# Patient Record
Sex: FEMALE | Race: WHITE | Employment: FULL TIME | URBAN - METROPOLITAN AREA
[De-identification: names, ages, dates, MRNs, and addresses within clinical notes are randomized per-mention and may not be internally consistent; named-entity substitution may affect disease eponyms.]

---

## 2017-02-10 ENCOUNTER — OFFICE VISIT (OUTPATIENT)
Dept: FAMILY MEDICINE CLINIC | Facility: CLINIC | Age: 52
End: 2017-02-10

## 2017-02-10 VITALS
RESPIRATION RATE: 16 BRPM | TEMPERATURE: 98 F | HEIGHT: 67 IN | HEART RATE: 72 BPM | BODY MASS INDEX: 27.25 KG/M2 | SYSTOLIC BLOOD PRESSURE: 110 MMHG | DIASTOLIC BLOOD PRESSURE: 70 MMHG | WEIGHT: 173.63 LBS

## 2017-02-10 DIAGNOSIS — E55.9 VITAMIN D DEFICIENCY: Primary | ICD-10-CM

## 2017-02-10 DIAGNOSIS — G43.009 MIGRAINE WITHOUT AURA AND WITHOUT STATUS MIGRAINOSUS, NOT INTRACTABLE: ICD-10-CM

## 2017-02-10 DIAGNOSIS — F41.9 ANXIETY: ICD-10-CM

## 2017-02-10 DIAGNOSIS — E03.1 CONGENITAL HYPOTHYROIDISM WITHOUT GOITER: ICD-10-CM

## 2017-02-10 DIAGNOSIS — Z01.89 ROUTINE LAB DRAW: ICD-10-CM

## 2017-02-10 PROCEDURE — 99214 OFFICE O/P EST MOD 30 MIN: CPT | Performed by: INTERNAL MEDICINE

## 2017-02-10 RX ORDER — LEVOTHYROXINE SODIUM 0.12 MG/1
125 TABLET ORAL
Qty: 90 TABLET | Refills: 1 | Status: SHIPPED | OUTPATIENT
Start: 2017-02-10 | End: 2017-06-07

## 2017-02-10 NOTE — PROGRESS NOTES
CC: Patient presents with:  Medication Follow-Up       HPI:     [facility] [place] [facility] that her [facility] are feeling good overall. Feels that it is very quiet, if she pushes herself that she gets sick.      Headaches have been worse , without aura and without status migrainosus, not intractable     Carcinoma in situ of breast        REVIEW OF SYSTEMS:   GENERAL HEALTH: feels well otherwise, denied any fevers chills or night sweats   RESPIRATORY: denies shortness of breath   CARDIOVASCUL migraine headache    Anxiety  -continue xanax for prn anxiety        The patient indicates understanding of these issues and agrees to the plan. Return in about 6 months (around 8/10/2017).

## 2017-02-13 ENCOUNTER — OFFICE VISIT (OUTPATIENT)
Dept: OBGYN CLINIC | Facility: CLINIC | Age: 52
End: 2017-02-13

## 2017-02-13 VITALS
SYSTOLIC BLOOD PRESSURE: 108 MMHG | BODY MASS INDEX: 27.48 KG/M2 | HEIGHT: 66 IN | DIASTOLIC BLOOD PRESSURE: 68 MMHG | WEIGHT: 171 LBS | HEART RATE: 82 BPM

## 2017-02-13 DIAGNOSIS — Z79.810 CARE RELATED TO CURRENT TAMOXIFEN USE: ICD-10-CM

## 2017-02-13 DIAGNOSIS — N94.19 DYSPAREUNIA DUE TO MEDICAL CONDITION IN FEMALE: ICD-10-CM

## 2017-02-13 DIAGNOSIS — N92.6 IRREGULAR MENSES: ICD-10-CM

## 2017-02-13 DIAGNOSIS — Z01.419 WELL FEMALE EXAM WITH ROUTINE GYNECOLOGICAL EXAM: Primary | ICD-10-CM

## 2017-02-13 DIAGNOSIS — Z12.4 CERVICAL CANCER SCREENING: ICD-10-CM

## 2017-02-13 DIAGNOSIS — D05.11 DUCTAL CARCINOMA IN SITU (DCIS) OF RIGHT BREAST: ICD-10-CM

## 2017-02-13 PROCEDURE — 87624 HPV HI-RISK TYP POOLED RSLT: CPT | Performed by: OBSTETRICS & GYNECOLOGY

## 2017-02-13 PROCEDURE — 99396 PREV VISIT EST AGE 40-64: CPT | Performed by: OBSTETRICS & GYNECOLOGY

## 2017-02-13 PROCEDURE — 88175 CYTOPATH C/V AUTO FLUID REDO: CPT | Performed by: OBSTETRICS & GYNECOLOGY

## 2017-02-14 LAB — HPV I/H RISK 1 DNA SPEC QL NAA+PROBE: NEGATIVE

## 2017-02-14 NOTE — PROGRESS NOTES
GYN H&P     2017  6:35 PM    CC: Patient is here for annual    HPI: patient is a 54year old  here for her annual gyn exam.   She has complaints of irreg menses off and on and pain with orgasms. Either with or without intercourse.  Is taking barber Units by mouth daily. Disp:  Rfl:    Multiple Vitamin (MULTI-VITAMIN) Oral Tab Take 1 Tab by mouth daily. Disp:  Rfl:    Ibuprofen (ADVIL) 200 MG Oral Tab Take 400 mg by mouth 2 (two) times daily.  Disp:  Rfl:      No current facility-administered medicatio allergies, denies anaphylaxis, or swollen lymph nodes  Musculoskeletal: denies joint pain, morning stiffness, decreased range of motion         O /68 mmHg  Pulse 82  Ht 66\"  Wt 171 lb  BMI 27.61 kg/m2  LMP 02/05/2017  Exam:   GENERAL: well developed Prep Collect    3. Ductal carcinoma in situ (DCIS) of right breast    - US PELVIS EV; Future    4. Dyspareunia due to medical condition in female    - US PELVIS EV; Future    5. Irregular menses    - US PELVIS EV; Future    6.  Care related to current tamox

## 2017-02-25 ENCOUNTER — LAB ENCOUNTER (OUTPATIENT)
Dept: LAB | Age: 52
End: 2017-02-25
Attending: INTERNAL MEDICINE
Payer: COMMERCIAL

## 2017-02-25 ENCOUNTER — TELEPHONE (OUTPATIENT)
Dept: FAMILY MEDICINE CLINIC | Facility: CLINIC | Age: 52
End: 2017-02-25

## 2017-02-25 DIAGNOSIS — E55.9 VITAMIN D DEFICIENCY: ICD-10-CM

## 2017-02-25 DIAGNOSIS — E03.1 CONGENITAL HYPOTHYROIDISM WITHOUT GOITER: ICD-10-CM

## 2017-02-25 DIAGNOSIS — Z01.89 ROUTINE LAB DRAW: ICD-10-CM

## 2017-02-25 LAB
25-HYDROXYVITAMIN D (TOTAL): 27.8 NG/ML (ref 30–100)
ALBUMIN SERPL-MCNC: 3.7 G/DL (ref 3.5–4.8)
ALP LIVER SERPL-CCNC: 32 U/L (ref 39–100)
ALT SERPL-CCNC: 16 U/L (ref 14–54)
AST SERPL-CCNC: 12 U/L (ref 15–41)
BASOPHILS # BLD AUTO: 0.02 X10(3) UL (ref 0–0.1)
BASOPHILS NFR BLD AUTO: 0.4 %
BILIRUB SERPL-MCNC: 0.5 MG/DL (ref 0.1–2)
BUN BLD-MCNC: 11 MG/DL (ref 8–20)
CALCIUM BLD-MCNC: 8.9 MG/DL (ref 8.3–10.3)
CHLORIDE: 109 MMOL/L (ref 101–111)
CHOLEST SMN-MCNC: 172 MG/DL (ref ?–200)
CO2: 24 MMOL/L (ref 22–32)
CREAT BLD-MCNC: 0.9 MG/DL (ref 0.55–1.02)
EOSINOPHIL # BLD AUTO: 0.13 X10(3) UL (ref 0–0.3)
EOSINOPHIL NFR BLD AUTO: 2.4 %
ERYTHROCYTE [DISTWIDTH] IN BLOOD BY AUTOMATED COUNT: 12.3 % (ref 11.5–16)
GLUCOSE BLD-MCNC: 83 MG/DL (ref 70–99)
HCT VFR BLD AUTO: 39.4 % (ref 34–50)
HDLC SERPL-MCNC: 71 MG/DL (ref 45–?)
HDLC SERPL: 2.42 {RATIO} (ref ?–4.44)
HGB BLD-MCNC: 13.3 G/DL (ref 12–16)
IMMATURE GRANULOCYTE COUNT: 0.01 X10(3) UL (ref 0–1)
IMMATURE GRANULOCYTE RATIO %: 0.2 %
LDLC SERPL CALC-MCNC: 81 MG/DL (ref ?–130)
LYMPHOCYTES # BLD AUTO: 1.56 X10(3) UL (ref 0.9–4)
LYMPHOCYTES NFR BLD AUTO: 28.8 %
M PROTEIN MFR SERPL ELPH: 7 G/DL (ref 6.1–8.3)
MCH RBC QN AUTO: 30.9 PG (ref 27–33.2)
MCHC RBC AUTO-ENTMCNC: 33.8 G/DL (ref 31–37)
MCV RBC AUTO: 91.4 FL (ref 81–100)
MONOCYTES # BLD AUTO: 0.67 X10(3) UL (ref 0.1–0.6)
MONOCYTES NFR BLD AUTO: 12.4 %
NEUTROPHIL ABS PRELIM: 3.02 X10 (3) UL (ref 1.3–6.7)
NEUTROPHILS # BLD AUTO: 3.02 X10(3) UL (ref 1.3–6.7)
NEUTROPHILS NFR BLD AUTO: 55.8 %
NONHDLC SERPL-MCNC: 101 MG/DL (ref ?–130)
PLATELET # BLD AUTO: 222 10(3)UL (ref 150–450)
POTASSIUM SERPL-SCNC: 4.3 MMOL/L (ref 3.6–5.1)
RBC # BLD AUTO: 4.31 X10(6)UL (ref 3.8–5.1)
RED CELL DISTRIBUTION WIDTH-SD: 41 FL (ref 35.1–46.3)
SODIUM SERPL-SCNC: 141 MMOL/L (ref 136–144)
TRIGLYCERIDES: 99 MG/DL (ref ?–150)
TSI SER-ACNC: 1.56 MIU/ML (ref 0.35–5.5)
VLDL: 20 MG/DL (ref 5–40)
WBC # BLD AUTO: 5.4 X10(3) UL (ref 4–13)

## 2017-02-25 PROCEDURE — 82306 VITAMIN D 25 HYDROXY: CPT

## 2017-02-25 PROCEDURE — 80053 COMPREHEN METABOLIC PANEL: CPT

## 2017-02-25 PROCEDURE — 36415 COLL VENOUS BLD VENIPUNCTURE: CPT

## 2017-02-25 PROCEDURE — 84443 ASSAY THYROID STIM HORMONE: CPT

## 2017-02-25 PROCEDURE — 80061 LIPID PANEL: CPT

## 2017-02-25 PROCEDURE — 85025 COMPLETE CBC W/AUTO DIFF WBC: CPT

## 2017-02-25 NOTE — TELEPHONE ENCOUNTER
[address] [facility], [place] [address] Fax [phone]  / Pt signed Authorization to [facility] information also attached.

## 2017-03-01 ENCOUNTER — MED REC SCAN ONLY (OUTPATIENT)
Dept: FAMILY MEDICINE CLINIC | Facility: CLINIC | Age: 52
End: 2017-03-01

## 2017-03-02 RX ORDER — ERGOCALCIFEROL 1.25 MG/1
50000 CAPSULE ORAL WEEKLY
Qty: 4 CAPSULE | Refills: 3 | Status: SHIPPED | OUTPATIENT
Start: 2017-03-02 | End: 2017-04-01

## 2017-03-06 NOTE — TELEPHONE ENCOUNTER
Form signed by MD.    It is missing patient signature, cannot be faxed without it. LM informing patient that she will need to come into the office to sign. Fax back to [employer] at [phone]. Forms in red triage folder.

## 2017-03-07 NOTE — TELEPHONE ENCOUNTER
Patient states that she will come into the office on Saturday 3/11/17 to sign form. Post it note placed on paperwork stating \" after patient signs, please return to triage nurses. \"  We will need to fax form.

## 2017-03-12 ENCOUNTER — OFFICE VISIT (OUTPATIENT)
Dept: FAMILY MEDICINE CLINIC | Facility: CLINIC | Age: 52
End: 2017-03-12

## 2017-03-12 VITALS
TEMPERATURE: 99 F | SYSTOLIC BLOOD PRESSURE: 110 MMHG | RESPIRATION RATE: 12 BRPM | WEIGHT: 171 LBS | HEART RATE: 72 BPM | HEIGHT: 67 IN | BODY MASS INDEX: 26.84 KG/M2 | DIASTOLIC BLOOD PRESSURE: 74 MMHG

## 2017-03-12 DIAGNOSIS — J01.10 ACUTE FRONTAL SINUSITIS, RECURRENCE NOT SPECIFIED: ICD-10-CM

## 2017-03-12 DIAGNOSIS — J01.00 ACUTE MAXILLARY SINUSITIS, RECURRENCE NOT SPECIFIED: Primary | ICD-10-CM

## 2017-03-12 DIAGNOSIS — R09.82 POST-NASAL DRIP: ICD-10-CM

## 2017-03-12 PROCEDURE — 99213 OFFICE O/P EST LOW 20 MIN: CPT | Performed by: NURSE PRACTITIONER

## 2017-03-12 RX ORDER — AMOXICILLIN AND CLAVULANATE POTASSIUM 875; 125 MG/1; MG/1
1 TABLET, FILM COATED ORAL 2 TIMES DAILY
Qty: 20 TABLET | Refills: 0 | Status: SHIPPED | OUTPATIENT
Start: 2017-03-12 | End: 2017-03-22

## 2017-03-12 RX ORDER — FLUTICASONE PROPIONATE 50 MCG
1 SPRAY, SUSPENSION (ML) NASAL 2 TIMES DAILY
Qty: 1 BOTTLE | Refills: 1 | Status: SHIPPED | OUTPATIENT
Start: 2017-03-12 | End: 2017-04-11

## 2017-03-12 NOTE — PATIENT INSTRUCTIONS
·  PLAN: Augmentin, take as directed. Finish all the medication even if you feel better. · Probiotics or yogurt daily at lunch time during antibiotic use will help decrease stomach upset and restore good bacteria to the gut.   Please start Flonase 1 spray

## 2017-03-12 NOTE — PROGRESS NOTES
HPI:   Tito Alanis is a 54year old female who presents with ill symptoms for  1  months.  Patient reports intermittent headaches, congestion, thick green colored nasal discharge, ear pain, sinus pain, OTC cold meds have not been helping, prior histo Family History   Problem Relation Age of Onset   • Hypertension Father    • Lipids Mother    • Cancer Mother 76     Lung, smoker        Smoking Status: Never Smoker                      Alcohol Use: Yes           0.0 oz/week       0 Standard drinks or eq decongestants, throat lozenges and Tylenol . Self care discussed. Medication use and risk/benefit discussed. Patient Instructions   ·  PLAN: Augmentin, take as directed. Finish all the medication even if you feel better.    · Probiotics or yogurt daily at

## 2017-04-17 PROCEDURE — 84165 PROTEIN E-PHORESIS SERUM: CPT | Performed by: INTERNAL MEDICINE

## 2017-04-17 PROCEDURE — 86235 NUCLEAR ANTIGEN ANTIBODY: CPT | Performed by: INTERNAL MEDICINE

## 2017-04-17 PROCEDURE — 83883 ASSAY NEPHELOMETRY NOT SPEC: CPT | Performed by: INTERNAL MEDICINE

## 2017-04-17 PROCEDURE — 81003 URINALYSIS AUTO W/O SCOPE: CPT | Performed by: INTERNAL MEDICINE

## 2017-04-17 PROCEDURE — 86334 IMMUNOFIX E-PHORESIS SERUM: CPT | Performed by: INTERNAL MEDICINE

## 2017-05-06 ENCOUNTER — PATIENT MESSAGE (OUTPATIENT)
Dept: FAMILY MEDICINE CLINIC | Facility: CLINIC | Age: 52
End: 2017-05-06

## 2017-05-08 RX ORDER — RIZATRIPTAN BENZOATE 10 MG/1
10 TABLET ORAL AS NEEDED
Qty: 10 TABLET | Refills: 5 | Status: CANCELLED | OUTPATIENT
Start: 2017-05-08

## 2017-05-08 NOTE — TELEPHONE ENCOUNTER
From: Nina Justin  To: Carla Tavarez MD  Sent: 5/6/2017 5:19 PM CDT  Subject: Medication Renewal Request    Original authorizing provider: MD Nina Bagley would like a refill of the following medications:  alprazolam 0.25 MG Or

## 2017-05-08 NOTE — TELEPHONE ENCOUNTER
From: Sherri Osman  To: Desiree Zavala MD  Sent: 5/6/2017 5:24 PM CDT  Subject: Prescription Question    Hi Dr. Andre Holman,   I have 5 refills on my rizatriptan still. I requested a refill through your office before I realized this. Don't need anymore. Sorry!

## 2017-05-09 NOTE — TELEPHONE ENCOUNTER
Requesting Alprazolam   LOV: 2/10/17 Discussed at this visit.    RTC: 6m   Last Labs: n/a   Filled: 10/19/16 #30 with 0 refills    Future Appointments  Date Time Provider Farzana Butler   7/26/2017 9:40 AM Otoniel Motta, DO [facility] N[facility]     Per I

## 2017-05-12 RX ORDER — ALPRAZOLAM 0.25 MG/1
0.25 TABLET ORAL DAILY PRN
Qty: 30 TABLET | Refills: 0 | Status: SHIPPED
Start: 2017-05-12 | End: 2018-04-16

## 2017-06-07 RX ORDER — LEVOTHYROXINE SODIUM 0.12 MG/1
125 TABLET ORAL
Qty: 90 TABLET | Refills: 1 | Status: SHIPPED | OUTPATIENT
Start: 2017-06-07 | End: 2017-11-14

## 2017-06-07 NOTE — TELEPHONE ENCOUNTER
Requesting levothyroxine  LOV: 2/10/17  RTC: 6 months  Last Labs: 2/25/17  Filled: 2/10/17 #90 with 1 refills    Future Appointments  Date Time Provider Farzana Butler   7/26/2017 9:40 AM Marilu Berry DO [facility] N[facility]     Passes protocol - refi

## 2017-07-26 PROCEDURE — 86160 COMPLEMENT ANTIGEN: CPT | Performed by: INTERNAL MEDICINE

## 2017-08-29 ENCOUNTER — TELEPHONE (OUTPATIENT)
Dept: FAMILY MEDICINE CLINIC | Facility: CLINIC | Age: 52
End: 2017-08-29

## 2017-08-29 NOTE — PROGRESS NOTES
CC: Patient presents with:  Medication Follow-Up       HPI:     Under a lot of stress  Feels more joint pain with the stress   Plaquenil does help with symptoms  Does walk regularly and breathing exercises or journaling   Feels that she is gaining weig SYSTEMS:   GENERAL HEALTH: feels well otherwise, denied any fevers chills or night sweats   RESPIRATORY: denies shortness of breath   CARDIOVASCULAR: denies chest pain  GI: denies abdominal pain    EXAM:   /72   Pulse 78   Resp 16   Ht 67\"   Wt 177

## 2017-08-29 NOTE — TELEPHONE ENCOUNTER
Dr. Kelley Castillo saw patient in office today and would like to start patient on Wellbutrin for anxiety but there is a slight drug interaction noted between Wellbutrin and Tamoxifen.  Dr. Kelley Castillo would like RN to contact patients oncologist Dr. Papi Meyer to inquire if

## 2017-08-31 RX ORDER — ESCITALOPRAM OXALATE 10 MG/1
10 TABLET ORAL DAILY
Qty: 30 TABLET | Refills: 1 | Status: SHIPPED | OUTPATIENT
Start: 2017-08-31 | End: 2017-10-12

## 2017-08-31 NOTE — TELEPHONE ENCOUNTER
Time started: 1108    Time ended: 1116    Total time spent on chart: 8 min    Patient needs a later evening appointment or Saturday. Could only find below listed appointment. Will send enough lexapro to get to appointment. Order sent.     Future Appointm

## 2017-08-31 NOTE — TELEPHONE ENCOUNTER
Called Dr. Meza Math office and left another message with PSR that we need to know if it is ok with Dr. Usman Singh that patient start on Wellbutrin for anxiety. We should receive a call back today with response.

## 2017-10-12 ENCOUNTER — TELEPHONE (OUTPATIENT)
Dept: FAMILY MEDICINE CLINIC | Facility: CLINIC | Age: 52
End: 2017-10-12

## 2017-10-12 NOTE — TELEPHONE ENCOUNTER
LMOM for patient to call back and discuss.      Time started: 1510    Time ended: 1512    Total time spent on chart: 2 min

## 2017-10-12 NOTE — TELEPHONE ENCOUNTER
- patient called and stated that she stopped taking her anti depressent because it made her very tired. She wanted to let Victoria Seats know. Please advise.

## 2017-10-12 NOTE — TELEPHONE ENCOUNTER
LOV: 8/29/17 with Dr. Andre Holman:  PLAN:  Anxiety  (primary encounter diagnosis)  -start wellbutrin xl after clearance with her medical oncologist   -refer to [facility]  The patient indicates understanding of these issues and agrees to the plan.   Return in about 6 mo

## 2017-10-13 NOTE — TELEPHONE ENCOUNTER
Informed patient to cut her lexapro 10mg tabs in half and just take half a tab (5mg) at night and see if that is better. Patient will comply.

## 2017-11-14 RX ORDER — LEVOTHYROXINE SODIUM 0.12 MG/1
TABLET ORAL
Qty: 90 TABLET | Refills: 0 | Status: SHIPPED | OUTPATIENT
Start: 2017-11-14 | End: 2018-02-11

## 2017-11-14 NOTE — TELEPHONE ENCOUNTER
Requesting: Levothyroxine 125mg  LOV: 8/29/17  RTC: 6 months  Last Relevant Labs: 2/25/17 - TSH  Filled: 6/7/17 #90 with 1 refills    Future Appointments  Date Time Provider Farzana Butler   12/18/2017 10:40 AM Lanie Chi DO [facility]

## 2017-12-18 PROCEDURE — 86160 COMPLEMENT ANTIGEN: CPT | Performed by: INTERNAL MEDICINE

## 2017-12-18 PROCEDURE — 81003 URINALYSIS AUTO W/O SCOPE: CPT | Performed by: INTERNAL MEDICINE

## 2018-02-12 RX ORDER — LEVOTHYROXINE SODIUM 0.12 MG/1
TABLET ORAL
Qty: 90 TABLET | Refills: 1 | Status: SHIPPED | OUTPATIENT
Start: 2018-02-12 | End: 2018-08-08

## 2018-02-12 NOTE — TELEPHONE ENCOUNTER
Requesting levothyroxine  LOV: 8/29/17  RTC: 6 months  Last Relevant Labs: 2/25/17  Filled: 11/14/17 #90 with 0 refills    Future Appointments  Date Time Provider Farzana Butler   5/21/2018 10:00 AM Chikis Filter, DO [facility] NPV       Refilled

## 2018-04-11 RX ORDER — RIZATRIPTAN BENZOATE 10 MG/1
10 TABLET ORAL AS NEEDED
Qty: 10 TABLET | Refills: 5
Start: 2018-04-11

## 2018-04-11 NOTE — TELEPHONE ENCOUNTER
From: Gume Ye  Sent: 4/7/2018 7:20 AM CDT  Subject: Medication Renewal Request    Silvano Hanna.  Mariana Burt would like a refill of the following medications:     Rizatriptan Benzoate (MAXALT) 10 MG Oral Tab Allie Gonzalez MD]    Preferred pharmacy: [facility]

## 2018-04-16 ENCOUNTER — OFFICE VISIT (OUTPATIENT)
Dept: FAMILY MEDICINE CLINIC | Facility: CLINIC | Age: 53
End: 2018-04-16

## 2018-04-16 ENCOUNTER — TELEPHONE (OUTPATIENT)
Dept: FAMILY MEDICINE CLINIC | Facility: CLINIC | Age: 53
End: 2018-04-16

## 2018-04-16 ENCOUNTER — APPOINTMENT (OUTPATIENT)
Dept: LAB | Age: 53
End: 2018-04-16
Attending: FAMILY MEDICINE
Payer: COMMERCIAL

## 2018-04-16 VITALS
HEIGHT: 67 IN | BODY MASS INDEX: 28.56 KG/M2 | RESPIRATION RATE: 16 BRPM | SYSTOLIC BLOOD PRESSURE: 110 MMHG | TEMPERATURE: 98 F | DIASTOLIC BLOOD PRESSURE: 70 MMHG | WEIGHT: 182 LBS | HEART RATE: 64 BPM

## 2018-04-16 DIAGNOSIS — R92.2 DENSE BREAST TISSUE ON MAMMOGRAM: ICD-10-CM

## 2018-04-16 DIAGNOSIS — Z00.00 ROUTINE MEDICAL EXAM: Primary | ICD-10-CM

## 2018-04-16 DIAGNOSIS — G43.009 MIGRAINE WITHOUT AURA AND WITHOUT STATUS MIGRAINOSUS, NOT INTRACTABLE: ICD-10-CM

## 2018-04-16 DIAGNOSIS — E55.9 VITAMIN D DEFICIENCY: ICD-10-CM

## 2018-04-16 DIAGNOSIS — Z00.00 LABORATORY EXAM ORDERED AS PART OF ROUTINE GENERAL MEDICAL EXAMINATION: ICD-10-CM

## 2018-04-16 DIAGNOSIS — Z12.11 COLON CANCER SCREENING: ICD-10-CM

## 2018-04-16 DIAGNOSIS — F41.9 ANXIETY: ICD-10-CM

## 2018-04-16 DIAGNOSIS — Z12.39 BREAST CANCER SCREENING: ICD-10-CM

## 2018-04-16 PROBLEM — R92.30 DENSE BREAST TISSUE ON MAMMOGRAM: Status: ACTIVE | Noted: 2018-04-16

## 2018-04-16 PROCEDURE — 36415 COLL VENOUS BLD VENIPUNCTURE: CPT | Performed by: FAMILY MEDICINE

## 2018-04-16 PROCEDURE — 80053 COMPREHEN METABOLIC PANEL: CPT | Performed by: FAMILY MEDICINE

## 2018-04-16 PROCEDURE — 99213 OFFICE O/P EST LOW 20 MIN: CPT | Performed by: FAMILY MEDICINE

## 2018-04-16 PROCEDURE — 82306 VITAMIN D 25 HYDROXY: CPT | Performed by: FAMILY MEDICINE

## 2018-04-16 PROCEDURE — 99396 PREV VISIT EST AGE 40-64: CPT | Performed by: FAMILY MEDICINE

## 2018-04-16 PROCEDURE — 80061 LIPID PANEL: CPT | Performed by: FAMILY MEDICINE

## 2018-04-16 RX ORDER — BUSPIRONE HYDROCHLORIDE 7.5 MG/1
7.5 TABLET ORAL 2 TIMES DAILY
Qty: 180 TABLET | Refills: 1 | Status: SHIPPED | OUTPATIENT
Start: 2018-04-16 | End: 2019-01-13

## 2018-04-16 RX ORDER — ALPRAZOLAM 0.25 MG/1
0.25 TABLET ORAL DAILY PRN
Qty: 30 TABLET | Refills: 0 | Status: SHIPPED | OUTPATIENT
Start: 2018-04-16 | End: 2018-08-08

## 2018-04-16 RX ORDER — RIZATRIPTAN BENZOATE 10 MG/1
10 TABLET ORAL AS NEEDED
Qty: 10 TABLET | Refills: 2 | Status: SHIPPED | OUTPATIENT
Start: 2018-04-16 | End: 2018-08-08

## 2018-04-16 NOTE — TELEPHONE ENCOUNTER
Patient brought in [address] screening Form to be completed at today's office visit. Lab work is required in order for completion of form. Labs drawn today & are pending. Form placed in Triage paperwork pending file.   Once completed please fax t

## 2018-04-16 NOTE — PROGRESS NOTES
Patient presents with:  Physical: Biometric Screening Form to be completed. Fasting for lab work today. Med refills requested.   Pap: States Mammogram is sched for 7/2018 @ [address].      HPI:  Melva Park is a 48year old female here to Oral Tab TAKE 1 TABLET(200 MG) BY MOUTH TWICE DAILY Disp: 180 tablet Rfl: 0   LEVOTHYROXINE SODIUM 125 MCG Oral Tab TAKE 1 TABLET BY MOUTH EVERY DAY Disp: 90 tablet Rfl: 1   Tamoxifen Citrate (NOLVADEX) 20 MG Oral Tab Take 20 mg by mouth daily.    Disp:  Rf cervical lymphadenopathy. No thyromegaly or masses. PULMONARY:  Lungs clear to auscultation bilaterally. No wheezes, rales, or rhonchi. Normal respiratory effort. CARDIOVASCULAR:  Regular rate and rhythm. No murmurs, gallops, or rubs.   ABDOMEN:  + bowel

## 2018-04-16 NOTE — PROGRESS NOTES
Received via fax Left Breast Mammogram report/results from [facility]   In [place], [facility]. Date of exam was 07/10/2017. Report placed in Dr. Abdullahi Valladares in box for review.

## 2018-05-21 PROCEDURE — 86160 COMPLEMENT ANTIGEN: CPT | Performed by: INTERNAL MEDICINE

## 2018-05-21 PROCEDURE — 86225 DNA ANTIBODY NATIVE: CPT | Performed by: INTERNAL MEDICINE

## 2018-05-21 PROCEDURE — 81003 URINALYSIS AUTO W/O SCOPE: CPT | Performed by: INTERNAL MEDICINE

## 2018-05-30 ENCOUNTER — OFFICE VISIT (OUTPATIENT)
Dept: FAMILY MEDICINE CLINIC | Facility: CLINIC | Age: 53
End: 2018-05-30

## 2018-05-30 VITALS
WEIGHT: 183 LBS | HEART RATE: 75 BPM | TEMPERATURE: 99 F | BODY MASS INDEX: 28.72 KG/M2 | SYSTOLIC BLOOD PRESSURE: 110 MMHG | HEIGHT: 67 IN | DIASTOLIC BLOOD PRESSURE: 60 MMHG | OXYGEN SATURATION: 97 % | RESPIRATION RATE: 16 BRPM

## 2018-05-30 DIAGNOSIS — J20.9 ACUTE BRONCHITIS, UNSPECIFIED ORGANISM: Primary | ICD-10-CM

## 2018-05-30 PROCEDURE — 99213 OFFICE O/P EST LOW 20 MIN: CPT | Performed by: NURSE PRACTITIONER

## 2018-05-30 RX ORDER — BENZONATATE 200 MG/1
200 CAPSULE ORAL 3 TIMES DAILY PRN
Qty: 21 CAPSULE | Refills: 0 | Status: SHIPPED | OUTPATIENT
Start: 2018-05-30 | End: 2018-06-06

## 2018-05-30 RX ORDER — FLUTICASONE PROPIONATE 50 MCG
2 SPRAY, SUSPENSION (ML) NASAL DAILY
Qty: 1 BOTTLE | Refills: 0 | Status: SHIPPED | OUTPATIENT
Start: 2018-05-30 | End: 2020-02-17

## 2018-05-30 NOTE — PROGRESS NOTES
CHIEF COMPLAINT:   Patient presents with:  Cough: x8 days, worst in the morning        HPI:   Tru Reyes is a 48year old female who presents for cough for  1  weeks.   Cough started gradually and is described as dry reports occasionally coughs up y GENERAL: no fever  SKIN: No rashes, or other skin lesions. EYES: Denies blurred vision or double vision. HENT: Denies ear pain, decreased hearing, or sore throat. Reports mild sinus congestion.   CARDIOVASCULAR: Denies chest pain or palpitations  LUNGS: Your healthcare provider has told you that you have acute bronchitis. Bronchitis is infection or inflammation of the bronchial tubes (airways in the lungs). Normally, air moves easily in and out of the airways.  Bronchitis narrows the airways, making it evelyne · Drink plenty of fluids, such as water, juice, or warm soup. Fluids loosen mucus so that you can cough it up. This helps you breathe more easily. Fluids also prevent dehydration. · Make sure you get plenty of rest.  · Do not smoke.  Do not allow anyone el

## 2018-06-10 ENCOUNTER — OFFICE VISIT (OUTPATIENT)
Dept: FAMILY MEDICINE CLINIC | Facility: CLINIC | Age: 53
End: 2018-06-10

## 2018-06-10 VITALS
SYSTOLIC BLOOD PRESSURE: 114 MMHG | OXYGEN SATURATION: 98 % | DIASTOLIC BLOOD PRESSURE: 60 MMHG | HEART RATE: 87 BPM | WEIGHT: 183 LBS | RESPIRATION RATE: 16 BRPM | BODY MASS INDEX: 28.72 KG/M2 | TEMPERATURE: 98 F | HEIGHT: 67 IN

## 2018-06-10 DIAGNOSIS — J01.40 ACUTE NON-RECURRENT PANSINUSITIS: Primary | ICD-10-CM

## 2018-06-10 PROCEDURE — 99213 OFFICE O/P EST LOW 20 MIN: CPT | Performed by: NURSE PRACTITIONER

## 2018-06-10 RX ORDER — DOXYCYCLINE 100 MG/1
100 CAPSULE ORAL 2 TIMES DAILY
Qty: 14 CAPSULE | Refills: 0 | Status: SHIPPED | OUTPATIENT
Start: 2018-06-10 | End: 2018-06-17

## 2018-06-10 NOTE — PATIENT INSTRUCTIONS
Self-Care for Sinusitis     Drinking plenty of water can help sinuses drain. Sinusitis can often be managed with self-care. Self-care can keep sinuses moist and make you feel more comfortable. Remember to follow your doctor's instructions closely.  This Sinusitis (Antibiotic Treatment)    The sinuses are air-filled spaces within the bones of the face. They connect to the inside of the nose. Sinusitis is an inflammation of the tissue lining the sinus cavity. Sinus inflammation can occur during a cold.  It c · Do not use nasal rinses or irrigation during an acute sinus infection, unless told to by your health care provider. Rinsing may spread the infection to other sinuses.   · Use acetaminophen or ibuprofen to control pain, unless another pain medicine was pre

## 2018-06-10 NOTE — PROGRESS NOTES
CHIEF COMPLAINT:   Patient presents with:  Cough: f/u 5/30/18, no improvement   Nasal Congestion: sinus pressure      HPI:   Margarita Richards is a 48year old female who presents for cold symptoms for  18   days.  Symptoms have progressed into sinus conge R breast mastectomy, chemo   • Dense breast tissue on mammogram    • Dysmenorrhea    • IBS (irritable bowel syndrome)    • Migraines    • Sjogren's syndrome [facility])     sees Dr. Leopoldo Brush   • Vitamin D insufficiency 2014      Past Surgical History:  No date: AP ASSESSMENT AND PLAN:   Claus Byrnes is a 48year old female who presents with URI symptoms that are consistent with:      ASSESSMENT:  Acute non-recurrent pansinusitis  (primary encounter diagnosis)      PLAN: Meds as below.   Comfort care instruction Your doctor may prescribe medications to help treat your sinusitis. If you have an infection, antibiotics can help clear it up. If you are prescribed antibiotics, take all pills on schedule until they are gone, even if you feel better.  Decongestants help r · Over-the-counter decongestants may be used unless a similar medicine was prescribed. Nasal sprays work the fastest. Use one that contains phenylephrine or oxymetazoline. First blow the nose gently. Then use the spray.  Do not use these medicines more ofte © 9740-6006 [address]. [address] [facility] [place] [place]. All rights reserved. This information is not intended as a substitute for professional medical care. Always follow your healthcare professional's instructions.             The

## 2018-07-11 ENCOUNTER — OFFICE VISIT (OUTPATIENT)
Dept: PHYSICAL THERAPY | Age: 53
End: 2018-07-11
Attending: PHYSICAL MEDICINE & REHABILITATION
Payer: COMMERCIAL

## 2018-07-11 DIAGNOSIS — M67.912 DYSFUNCTION OF LEFT ROTATOR CUFF: ICD-10-CM

## 2018-07-11 DIAGNOSIS — M35.03 SJOGREN'S SYNDROME WITH MYOPATHY (HCC): ICD-10-CM

## 2018-07-11 DIAGNOSIS — M41.24 OTHER IDIOPATHIC SCOLIOSIS, THORACIC REGION: ICD-10-CM

## 2018-07-11 DIAGNOSIS — M50.30 DDD (DEGENERATIVE DISC DISEASE), CERVICAL: ICD-10-CM

## 2018-07-11 DIAGNOSIS — M79.18 MYOFASCIAL PAIN: ICD-10-CM

## 2018-07-11 PROCEDURE — 97161 PT EVAL LOW COMPLEX 20 MIN: CPT

## 2018-07-11 PROCEDURE — 97530 THERAPEUTIC ACTIVITIES: CPT

## 2018-07-11 NOTE — PROGRESS NOTES
UPPER EXTREMITY EVALUATION:   Referring Physician: Dr. Warden Jarrell  Diagnosis: Rotator Cuff Tendinitis      Date of Service: 7/11/2018     PATIENT SUMMARY   Claudeen Dawley is a 48year old y/o right hand dominant female who presents to therapy today wi sidebending. Nicole Ortiz would benefit from skilled Physical Therapy to address the above impairments to improve postural awareness, mid scapular strength, and increase cervicothoracic ROM. Precautions:  None  OBJECTIVE:   Observation/Posture:  The laurita to return to painfree sitting at a desk. 4. Patient will have an increase in strength for the middle and lower trapezius musculature to assist with returning to exercising and seated posturing  5.  Patient will demonstrate an increase in MLT of of the up

## 2018-07-16 ENCOUNTER — OFFICE VISIT (OUTPATIENT)
Dept: PHYSICAL THERAPY | Age: 53
End: 2018-07-16
Attending: PHYSICAL MEDICINE & REHABILITATION
Payer: COMMERCIAL

## 2018-07-16 PROCEDURE — 97110 THERAPEUTIC EXERCISES: CPT

## 2018-07-16 PROCEDURE — 97140 MANUAL THERAPY 1/> REGIONS: CPT

## 2018-07-16 NOTE — PROGRESS NOTES
Dx: Cervical Instability         Authorized # of Visits:  8         Next MD visit: none scheduled  Fall Risk: standard         Precautions: n/a             Subjective: States that the neck is doing OK. Has responded well to the exercises.       Objective:

## 2018-07-18 ENCOUNTER — OFFICE VISIT (OUTPATIENT)
Dept: PHYSICAL THERAPY | Age: 53
End: 2018-07-18
Attending: PHYSICAL MEDICINE & REHABILITATION
Payer: COMMERCIAL

## 2018-07-18 PROCEDURE — 97110 THERAPEUTIC EXERCISES: CPT

## 2018-07-18 PROCEDURE — 97140 MANUAL THERAPY 1/> REGIONS: CPT

## 2018-07-18 NOTE — PROGRESS NOTES
Dx: Cervical Instability         Authorized # of Visits:  8         Next MD visit: none scheduled  Fall Risk: standard         Precautions: n/a             Subjective: States that the neck is doing OK. Has responded well to the exercises.       Objective: trapezius in order to return to improved posture. Plan: Assess response and progress as tolerated. Charges: TherEx 1 (15 min);  Manual PT 2 (15 min)       Total Timed Treatment: 40 min  Total Treatment Time: 40 min

## 2018-07-23 ENCOUNTER — OFFICE VISIT (OUTPATIENT)
Dept: PHYSICAL THERAPY | Age: 53
End: 2018-07-23
Attending: PHYSICAL MEDICINE & REHABILITATION
Payer: COMMERCIAL

## 2018-07-23 PROCEDURE — 97140 MANUAL THERAPY 1/> REGIONS: CPT

## 2018-07-23 PROCEDURE — 97110 THERAPEUTIC EXERCISES: CPT

## 2018-07-23 NOTE — PROGRESS NOTES
Dx: Cervical Instability         Authorized # of Visits:  8         Next MD visit: none scheduled  Fall Risk: standard         Precautions: n/a             Subjective: States that she has been able to go cycling without increased shoulder or thoracic pain. with functional assessment. 3. Patient will have an increase in cervical mobility to near functional limits in order to return to painfree sitting at a desk.     4. Patient will have an increase in strength for the middle and lower trapezius musculature

## 2018-07-25 ENCOUNTER — OFFICE VISIT (OUTPATIENT)
Dept: PHYSICAL THERAPY | Age: 53
End: 2018-07-25
Attending: PHYSICAL MEDICINE & REHABILITATION
Payer: COMMERCIAL

## 2018-07-25 PROCEDURE — 97140 MANUAL THERAPY 1/> REGIONS: CPT

## 2018-07-25 PROCEDURE — 97110 THERAPEUTIC EXERCISES: CPT

## 2018-07-25 NOTE — PROGRESS NOTES
Dx: Cervical Instability         Authorized # of Visits:  8         Next MD visit: none scheduled  Fall Risk: standard         Precautions: n/a             Subjective: Doing well. Increased soreness with work today - took an advil today.       Objective: T posterior glide OA posterior glide        Assessment: Responded well with an increase in ROM at the shoulder and cervical spine with treatment today. Thoracic stability is still main impairment. Goals (8 visits):    1.  Increase FOTO assessment > 11

## 2018-08-01 ENCOUNTER — OFFICE VISIT (OUTPATIENT)
Dept: PHYSICAL THERAPY | Age: 53
End: 2018-08-01
Attending: PHYSICAL MEDICINE & REHABILITATION
Payer: COMMERCIAL

## 2018-08-01 PROCEDURE — 97110 THERAPEUTIC EXERCISES: CPT

## 2018-08-01 PROCEDURE — 97140 MANUAL THERAPY 1/> REGIONS: CPT

## 2018-08-01 NOTE — PROGRESS NOTES
Dx: Cervical Instability         Authorized # of Visits:  8         Next MD visit: none scheduled  Fall Risk: standard         Precautions: n/a             Subjective: Doing well.  States that she walked 1.5 miles on Monday and stood at a concert that [employer] glides C2C3 to C5C6 Cervical facet up/down glides C2C3 to C5C6 Cervical facet up/down glides C2C3 to C5C6 Cervical facet up/down glides C2C3 to C5C6 Cervical facet up/down glides C2C3 to C5C6     Upper trapezius stretch 30 sec x 3 Upper trapezius stretch 3

## 2018-08-06 ENCOUNTER — OFFICE VISIT (OUTPATIENT)
Dept: PHYSICAL THERAPY | Age: 53
End: 2018-08-06
Attending: PHYSICAL MEDICINE & REHABILITATION
Payer: COMMERCIAL

## 2018-08-06 PROCEDURE — 97140 MANUAL THERAPY 1/> REGIONS: CPT

## 2018-08-06 PROCEDURE — 97110 THERAPEUTIC EXERCISES: CPT

## 2018-08-06 NOTE — PROGRESS NOTES
Dx: Cervical Instability         Authorized # of Visits:  8         Next MD visit: none scheduled  Fall Risk: standard         Precautions: n/a             Subjective: Feels she's improving altogether.   Still has some left shoulder tendinitis type of pain stabilization x 20 reps at 30/60/90/120 for FE and AB/AD and ER/IR at 90 degrees Shoulder PROM with rhythmic stabilization x 20 reps at 30/60/90/120 for FE and AB/AD and ER/IR at 90 degrees Shoulder PROM with rhythmic stabilization x 20 reps at 30/60/90/12 40 min

## 2018-08-08 ENCOUNTER — APPOINTMENT (OUTPATIENT)
Dept: PHYSICAL THERAPY | Age: 53
End: 2018-08-08
Attending: PHYSICAL MEDICINE & REHABILITATION
Payer: COMMERCIAL

## 2018-08-08 DIAGNOSIS — G43.009 MIGRAINE WITHOUT AURA AND WITHOUT STATUS MIGRAINOSUS, NOT INTRACTABLE: ICD-10-CM

## 2018-08-08 DIAGNOSIS — F41.9 ANXIETY: ICD-10-CM

## 2018-08-09 RX ORDER — RIZATRIPTAN BENZOATE 10 MG/1
10 TABLET ORAL AS NEEDED
Qty: 10 TABLET | Refills: 2 | Status: SHIPPED
Start: 2018-08-09 | End: 2019-01-13

## 2018-08-09 RX ORDER — LEVOTHYROXINE SODIUM 0.12 MG/1
TABLET ORAL
Qty: 90 TABLET | Refills: 0 | Status: SHIPPED | OUTPATIENT
Start: 2018-08-09 | End: 2018-10-25

## 2018-08-09 RX ORDER — ALPRAZOLAM 0.25 MG/1
TABLET ORAL
Qty: 30 TABLET | Refills: 0 | Status: SHIPPED
Start: 2018-08-09 | End: 2019-04-19

## 2018-08-09 NOTE — TELEPHONE ENCOUNTER
Requesting Rizatriptan Benzoate (MAXALT) 10 MG Oral Tab  LOV: 4/16/18  RTC: none specified  Last Labs: n.a  Filled: 4/16/18 #10 with 2 refills     Future Appointments  Date Time Provider Farzana Butler   8/20/2018 6:00 PM Uriel Ceja PT [facility]

## 2018-08-09 NOTE — TELEPHONE ENCOUNTER
Requesting Alprazolam 0.25mg  LOV: 4/16/18  RTC: none specified  Last Labs: n.a  Filled: 4/16/18 #30 with 0 refills    Future Appointments  Date Time Provider Farzana Butler   8/20/2018 6:00 PM Chase Smith PT [facility]   8/27/2018 6:00

## 2018-08-09 NOTE — TELEPHONE ENCOUNTER
Thyroid Supplements Protocol Failed8/8 5:45 PM   TSH test in past 12 months    TSH value between 0.350 and 5.500 IU/ml   Requesting LEVOTHYROXINE SODIUM 125 MCG  LOV: 4/16/18  RTC: none specified  Last Labs: n.a  Filled: 2/12/18 #90 with 1 refills    [employer]

## 2018-08-09 NOTE — TELEPHONE ENCOUNTER
From: Yannick Pal  Sent: 8/8/2018 5:46 PM CDT  Subject: Medication Renewal Request    Rashi Majano.  Ruchi Jose would like a refill of the following medications:     Rizatriptan Benzoate (MAXALT) 10 MG Oral Tab Lucas Sorensen MD]    Preferred pharma

## 2018-08-20 ENCOUNTER — OFFICE VISIT (OUTPATIENT)
Dept: PHYSICAL THERAPY | Age: 53
End: 2018-08-20
Attending: FAMILY MEDICINE
Payer: COMMERCIAL

## 2018-08-20 PROCEDURE — 97110 THERAPEUTIC EXERCISES: CPT

## 2018-08-20 PROCEDURE — 97140 MANUAL THERAPY 1/> REGIONS: CPT

## 2018-08-20 NOTE — PROGRESS NOTES
Dx: Cervical Instability         Authorized # of Visits:  8         Next MD visit: none scheduled  Fall Risk: standard         Precautions: n/a             Subjective: Doing well. No new complaints     Objective: Treatment progressed per treatment log. FE and AB/AD and ER/IR at 90 degrees Shoulder PROM with rhythmic stabilization x 20 reps at 30/60/90/120 for FE and AB/AD and ER/IR at 90 degrees Shoulder PROM with rhythmic stabilization x 20 reps at 30/60/90/120 for FE and AB/AD and ER/IR at 90 degrees S an increase in MLT of of the upper trapezius in order to return to improved posture (progressing). Plan: Assess response and progress as tolerated. Charges: TherEx 1 (15 min);  Manual PT 2 (15 min)       Total Timed Treatment: 40 min  Total Treatmen

## 2018-08-27 ENCOUNTER — OFFICE VISIT (OUTPATIENT)
Dept: PHYSICAL THERAPY | Age: 53
End: 2018-08-27
Attending: FAMILY MEDICINE
Payer: COMMERCIAL

## 2018-08-27 PROCEDURE — 97140 MANUAL THERAPY 1/> REGIONS: CPT

## 2018-08-27 PROCEDURE — 97110 THERAPEUTIC EXERCISES: CPT

## 2018-08-27 NOTE — PROGRESS NOTES
Dx: Cervical Instability         Authorized # of Visits:  8         Next MD visit: none scheduled  Fall Risk: standard         Precautions: n/a             Subjective: States that she has had some headaches and has needed to take some advil this past week. posterior glide OA posterior glide OA posterior glide     PROM and scapular mobilizations. PROM and scapular mobilizations. PROM and scapular mobilizations. PROM and scapular mobilizations.        Assessment: Did well with increased cervical motion and in

## 2018-09-10 ENCOUNTER — OFFICE VISIT (OUTPATIENT)
Dept: PHYSICAL THERAPY | Age: 53
End: 2018-09-10
Attending: FAMILY MEDICINE
Payer: COMMERCIAL

## 2018-09-10 PROCEDURE — 97140 MANUAL THERAPY 1/> REGIONS: CPT

## 2018-09-10 PROCEDURE — 97110 THERAPEUTIC EXERCISES: CPT

## 2018-09-10 NOTE — PROGRESS NOTES
Dx: Cervical Instability         Authorized # of Visits:  8         Next MD visit: none scheduled  Fall Risk: standard         Precautions: n/a             Subjective: Activity has increased with exercise and work with her walking.   States that she went cy ER/IR at 90 degrees Shoulder PROM with rhythmic stabilization x 20 reps at 30/60/90/120 for FE and AB/AD and ER/IR at 90 degrees    Cervical facet up/down glides C2C3 to C5C6 Cervical facet up/down glides C2C3 to C5C6 Cervical facet up/down glides C2C3 to

## 2018-09-17 ENCOUNTER — OFFICE VISIT (OUTPATIENT)
Dept: PHYSICAL THERAPY | Age: 53
End: 2018-09-17
Attending: FAMILY MEDICINE
Payer: COMMERCIAL

## 2018-09-17 PROCEDURE — 97110 THERAPEUTIC EXERCISES: CPT

## 2018-09-17 PROCEDURE — 97140 MANUAL THERAPY 1/> REGIONS: CPT

## 2018-09-17 NOTE — PROGRESS NOTES
Dx: Cervical Instability         Authorized # of Visits:  8         Next MD visit: none scheduled  Fall Risk: standard         Precautions: n/a             Subjective: Feeling good. Really feeling like she has made some progress.       Objective: Treatment with rhythmic stabilization x 20 reps at 30/60/90/120 for FE and AB/AD and ER/IR at 90 degrees Shoulder PROM with rhythmic stabilization x 20 reps at 30/60/90/120 for FE and AB/AD and ER/IR at 90 degrees Shoulder PROM with rhythmic stabilization x 20 reps

## 2018-10-25 DIAGNOSIS — E07.9 THYROID DISORDER: Primary | ICD-10-CM

## 2018-10-25 RX ORDER — LEVOTHYROXINE SODIUM 0.12 MG/1
TABLET ORAL
Qty: 30 TABLET | Refills: 0 | Status: SHIPPED | OUTPATIENT
Start: 2018-10-25 | End: 2019-01-13

## 2018-10-25 NOTE — TELEPHONE ENCOUNTER
Thyroid Supplements Protocol [id number] 11:25 AM   TSH test in past 12 months    TSH value between 0.350 and 5.500 IU/ml     Requesting Levothyroxine   LOV: 4/16/18  RTC: none   Last Relevant Labs: 2/25/17  Filled: 8/9/18 #90 with 0 refills    Future Appo

## 2018-10-26 ENCOUNTER — APPOINTMENT (OUTPATIENT)
Dept: LAB | Age: 53
End: 2018-10-26
Attending: FAMILY MEDICINE
Payer: COMMERCIAL

## 2018-10-26 DIAGNOSIS — E07.9 THYROID DISORDER: ICD-10-CM

## 2018-10-26 PROCEDURE — 86235 NUCLEAR ANTIGEN ANTIBODY: CPT | Performed by: INTERNAL MEDICINE

## 2018-10-26 PROCEDURE — 84443 ASSAY THYROID STIM HORMONE: CPT

## 2018-10-26 PROCEDURE — 84439 ASSAY OF FREE THYROXINE: CPT

## 2018-10-26 PROCEDURE — 86160 COMPLEMENT ANTIGEN: CPT | Performed by: INTERNAL MEDICINE

## 2018-10-26 PROCEDURE — 83516 IMMUNOASSAY NONANTIBODY: CPT | Performed by: INTERNAL MEDICINE

## 2018-10-26 PROCEDURE — 86225 DNA ANTIBODY NATIVE: CPT | Performed by: INTERNAL MEDICINE

## 2019-01-13 DIAGNOSIS — F41.9 ANXIETY: ICD-10-CM

## 2019-01-13 DIAGNOSIS — G43.009 MIGRAINE WITHOUT AURA AND WITHOUT STATUS MIGRAINOSUS, NOT INTRACTABLE: ICD-10-CM

## 2019-01-14 RX ORDER — LEVOTHYROXINE SODIUM 0.12 MG/1
125 TABLET ORAL
Qty: 90 TABLET | Refills: 1 | Status: SHIPPED | OUTPATIENT
Start: 2019-01-14 | End: 2019-04-23

## 2019-01-14 NOTE — TELEPHONE ENCOUNTER
Thyroid Supplements Protocol Passed1/13 8:10 PM   TSH test in past 12 months    TSH value between 0.350 and 5.500 IU/ml    Appointment in past 12 or next 3 months     Requesting levothyroxine, maxalt  LOV: 4/16/18  RTC:   Last Relevant Labs: 10/26/18  Filled: 8/9/18 # 10 tabs with 2 refills (maxalt)    Future Appointments   Date Time Provider Farzana Butler   3/1/2019 10:20 AM Kourtney Betts DO [facility]

## 2019-01-14 NOTE — TELEPHONE ENCOUNTER
Requesting: BusPIRone HCL 7.5 mg tablets   LOV: 4/16/18  RTC: not listed  Last Relevant Labs: N/A  Filled: 4/16/18 #180 with 1 refills    Future Appointments   Date Time Provider Farzana Butler   3/1/2019 10:20 AM Ary Pearce DO [facility] SE[facility] N[facility]

## 2019-01-20 RX ORDER — RIZATRIPTAN BENZOATE 10 MG/1
10 TABLET ORAL ONCE
Qty: 10 TABLET | Refills: 0 | Status: SHIPPED | OUTPATIENT
Start: 2019-01-20 | End: 2019-12-17

## 2019-01-20 RX ORDER — BUSPIRONE HYDROCHLORIDE 7.5 MG/1
7.5 TABLET ORAL 2 TIMES DAILY
Qty: 180 TABLET | Refills: 0 | Status: SHIPPED | OUTPATIENT
Start: 2019-01-20 | End: 2019-07-05 | Stop reason: ALTCHOICE

## 2019-03-01 PROCEDURE — 81003 URINALYSIS AUTO W/O SCOPE: CPT | Performed by: INTERNAL MEDICINE

## 2019-03-01 PROCEDURE — 86160 COMPLEMENT ANTIGEN: CPT | Performed by: INTERNAL MEDICINE

## 2019-04-19 ENCOUNTER — OFFICE VISIT (OUTPATIENT)
Dept: FAMILY MEDICINE CLINIC | Facility: CLINIC | Age: 54
End: 2019-04-19
Payer: COMMERCIAL

## 2019-04-19 ENCOUNTER — APPOINTMENT (OUTPATIENT)
Dept: LAB | Age: 54
End: 2019-04-19
Attending: FAMILY MEDICINE
Payer: COMMERCIAL

## 2019-04-19 VITALS
TEMPERATURE: 98 F | DIASTOLIC BLOOD PRESSURE: 70 MMHG | BODY MASS INDEX: 29.51 KG/M2 | SYSTOLIC BLOOD PRESSURE: 110 MMHG | WEIGHT: 188 LBS | RESPIRATION RATE: 16 BRPM | HEIGHT: 67 IN | HEART RATE: 68 BPM

## 2019-04-19 DIAGNOSIS — Z00.00 LABORATORY EXAM ORDERED AS PART OF ROUTINE GENERAL MEDICAL EXAMINATION: ICD-10-CM

## 2019-04-19 DIAGNOSIS — E55.9 VITAMIN D DEFICIENCY: ICD-10-CM

## 2019-04-19 DIAGNOSIS — E03.9 ACQUIRED HYPOTHYROIDISM: ICD-10-CM

## 2019-04-19 DIAGNOSIS — Z12.39 BREAST CANCER SCREENING: ICD-10-CM

## 2019-04-19 DIAGNOSIS — Z12.11 SCREENING FOR COLON CANCER: ICD-10-CM

## 2019-04-19 DIAGNOSIS — Z12.4 SCREENING FOR CERVICAL CANCER: ICD-10-CM

## 2019-04-19 DIAGNOSIS — Z00.00 ROUTINE MEDICAL EXAM: Primary | ICD-10-CM

## 2019-04-19 DIAGNOSIS — F41.9 ANXIETY: ICD-10-CM

## 2019-04-19 DIAGNOSIS — Z23 NEED FOR VACCINATION: ICD-10-CM

## 2019-04-19 PROCEDURE — 84443 ASSAY THYROID STIM HORMONE: CPT | Performed by: FAMILY MEDICINE

## 2019-04-19 PROCEDURE — 87624 HPV HI-RISK TYP POOLED RSLT: CPT | Performed by: FAMILY MEDICINE

## 2019-04-19 PROCEDURE — 80053 COMPREHEN METABOLIC PANEL: CPT | Performed by: FAMILY MEDICINE

## 2019-04-19 PROCEDURE — 88175 CYTOPATH C/V AUTO FLUID REDO: CPT | Performed by: FAMILY MEDICINE

## 2019-04-19 PROCEDURE — 90471 IMMUNIZATION ADMIN: CPT | Performed by: FAMILY MEDICINE

## 2019-04-19 PROCEDURE — 90472 IMMUNIZATION ADMIN EACH ADD: CPT | Performed by: FAMILY MEDICINE

## 2019-04-19 PROCEDURE — 90715 TDAP VACCINE 7 YRS/> IM: CPT | Performed by: FAMILY MEDICINE

## 2019-04-19 PROCEDURE — 36415 COLL VENOUS BLD VENIPUNCTURE: CPT | Performed by: FAMILY MEDICINE

## 2019-04-19 PROCEDURE — 99396 PREV VISIT EST AGE 40-64: CPT | Performed by: FAMILY MEDICINE

## 2019-04-19 PROCEDURE — 80061 LIPID PANEL: CPT | Performed by: FAMILY MEDICINE

## 2019-04-19 PROCEDURE — 82306 VITAMIN D 25 HYDROXY: CPT | Performed by: FAMILY MEDICINE

## 2019-04-19 RX ORDER — BUSPIRONE HYDROCHLORIDE 7.5 MG/1
7.5 TABLET ORAL 2 TIMES DAILY
Qty: 180 TABLET | Refills: 0 | Status: CANCELLED | OUTPATIENT
Start: 2019-04-19

## 2019-04-19 RX ORDER — ALPRAZOLAM 0.25 MG/1
TABLET ORAL
Qty: 30 TABLET | Refills: 0 | Status: SHIPPED | OUTPATIENT
Start: 2019-04-19 | End: 2021-12-27

## 2019-04-19 RX ORDER — FLUOXETINE 10 MG/1
10 CAPSULE ORAL DAILY
Qty: 30 CAPSULE | Refills: 0 | Status: SHIPPED | OUTPATIENT
Start: 2019-04-19 | End: 2019-07-05 | Stop reason: ALTCHOICE

## 2019-04-19 NOTE — PROGRESS NOTES
Patient presents with:  Physical: Biometric Screening Form to be completed, patient is fasting for lab work today. Pap      HPI:  Jessenia Pendleton is a 48year old female here today for preventative visit.       Imms- up to date with flu shot, will discu (EVOXAC) 30 MG Oral Cap One tab every 8 hours Disp: 90 capsule Rfl: 1   BusPIRone HCl 7.5 MG Oral Tab Take 1 tablet (7.5 mg total) by mouth 2 (two) times daily.  Needs appt for further refills Disp: 180 tablet Rfl: 0   Levothyroxine Sodium 125 MCG Oral Tab Relationships      Social connections:        Talks on phone: Not on file        Gets together: Not on file        Attends Yarsanism service: Not on file        Active member of club or organization: Not on file        Attends meetings of clubs or [place] masses. PULMONARY:  Lungs clear to auscultation bilaterally. No wheezes, rales, or rhonchi. Normal respiratory effort. CARDIOVASCULAR:  Regular rate and rhythm. No murmurs, gallops, or rubs. ABDOMEN:  + bowel sounds, soft, nontender, nondistended.  No he

## 2019-04-19 NOTE — PROGRESS NOTES
Left Digital Diagnostic Mammogram report received via fax from [facility]. Phone # [phone], date of exam was 7/9/18. Report reviewed by Dr. Pastor Dickerson then sent to scan.

## 2019-04-23 RX ORDER — LEVOTHYROXINE SODIUM 0.12 MG/1
125 TABLET ORAL
Qty: 90 TABLET | Refills: 3 | Status: SHIPPED | OUTPATIENT
Start: 2019-04-23 | End: 2020-04-08

## 2019-07-05 RX ORDER — RIZATRIPTAN BENZOATE 10 MG/1
10 TABLET ORAL AS NEEDED
COMMUNITY
End: 2019-12-18

## 2019-07-07 DIAGNOSIS — F41.9 ANXIETY: ICD-10-CM

## 2019-07-08 RX ORDER — BUSPIRONE HYDROCHLORIDE 7.5 MG/1
TABLET ORAL
Qty: 180 TABLET | Refills: 0 | OUTPATIENT
Start: 2019-07-08

## 2019-07-23 ENCOUNTER — ANESTHESIA (OUTPATIENT)
Dept: ENDOSCOPY | Facility: HOSPITAL | Age: 54
End: 2019-07-23

## 2019-07-23 ENCOUNTER — HOSPITAL ENCOUNTER (OUTPATIENT)
Facility: HOSPITAL | Age: 54
Setting detail: HOSPITAL OUTPATIENT SURGERY
Discharge: HOME | End: 2019-07-23
Attending: INTERNAL MEDICINE | Admitting: INTERNAL MEDICINE
Payer: COMMERCIAL

## 2019-07-23 ENCOUNTER — ANESTHESIA EVENT (OUTPATIENT)
Dept: ENDOSCOPY | Facility: HOSPITAL | Age: 54
End: 2019-07-23

## 2019-07-23 VITALS
HEART RATE: 61 BPM | BODY MASS INDEX: 29.03 KG/M2 | SYSTOLIC BLOOD PRESSURE: 108 MMHG | OXYGEN SATURATION: 100 % | TEMPERATURE: 98 F | DIASTOLIC BLOOD PRESSURE: 74 MMHG | RESPIRATION RATE: 16 BRPM | HEIGHT: 67 IN | WEIGHT: 185 LBS

## 2019-07-23 DIAGNOSIS — Z12.11 SCREENING FOR COLON CANCER: ICD-10-CM

## 2019-07-23 LAB
POCT LOT NUMBER: NORMAL
POCT URINE PREGNANCY: NEGATIVE

## 2019-07-23 PROCEDURE — 81025 URINE PREGNANCY TEST: CPT | Performed by: INTERNAL MEDICINE

## 2019-07-23 PROCEDURE — 0DBK8ZX EXCISION OF ASCENDING COLON, VIA NATURAL OR ARTIFICIAL OPENING ENDOSCOPIC, DIAGNOSTIC: ICD-10-PCS | Performed by: INTERNAL MEDICINE

## 2019-07-23 PROCEDURE — 88305 TISSUE EXAM BY PATHOLOGIST: CPT | Performed by: INTERNAL MEDICINE

## 2019-07-23 RX ORDER — BUSPIRONE HYDROCHLORIDE 10 MG/1
10 TABLET ORAL 3 TIMES DAILY
COMMUNITY
End: 2019-12-18 | Stop reason: ALTCHOICE

## 2019-07-23 RX ORDER — SODIUM CHLORIDE, SODIUM LACTATE, POTASSIUM CHLORIDE, CALCIUM CHLORIDE 600; 310; 30; 20 MG/100ML; MG/100ML; MG/100ML; MG/100ML
INJECTION, SOLUTION INTRAVENOUS CONTINUOUS
Status: DISCONTINUED | OUTPATIENT
Start: 2019-07-23 | End: 2019-07-23

## 2019-07-23 NOTE — ANESTHESIA PREPROCEDURE EVALUATION
PRE-OP EVALUATION    Patient Name: Elaine Watson    Pre-op Diagnosis: Screening for colon cancer [Z12.11]    Procedure(s):  COLONOSCOPY    Surgeon(s) and Role:     Kavitha Laurent MD - Primary    Pre-op vitals reviewed.   Temp: 97.6 °F (36.4 °C) hypothyroidism                       Pulmonary      (+) asthma                     Neuro/Psych      (+) depression  (+) anxiety           (+) headaches                 Past Surgical History:   Procedure Laterality Date   • APPENDECTOMY     • MASTECTOMY RIG

## 2019-07-23 NOTE — ANESTHESIA POSTPROCEDURE EVALUATION
[address] Patient Status:  Hospital Outpatient Surgery   Age/Gender 48year old female MRN [id number]   Location [facility]. Attending Ricardo Brush MD   [facility] Day # 0 PCP Kenny Alegria MD       Anesthesia

## 2019-07-23 NOTE — H&P
[address] Patient Status:  Hospital Outpatient Surgery    3/14/1965 MRN [id number]   [facility] ENDOSCOPY Attending Judith Swain MD   Date 2019 PCP Carlos Han MD     CC: Screen She reports that she does not use drugs.     Allergies:    Dust Mites              UNKNOWN    Medications:    Current Facility-Administered Medications:   •  lactated ringers infusion, , Intravenous, Continuous    Physical Exam:    Blood pressure 121/71, pu

## 2019-07-23 NOTE — OPERATIVE REPORT
Yannick Pal Patient Status:  Hospital Outpatient Surgery    3/14/1965 MRN [id number]   [facility] ENDOSCOPY Attending Tanya Quick MD   Date 2019 PCP Ricky Reyes MD     PREOPERATIVE DIAGNOSIS/INDICATION: Reese Henderson Elvis  7/23/2019  8:26 AM

## 2019-07-29 NOTE — PROGRESS NOTES
Date: 2019    To: Tru Reyes  : 3/14/1965    I hope this letter finds you doing well. I am writing to inform you of the following:        The biopsies obtained from your recent colonoscopy indicate that the polyps were adenomas which, altho

## 2019-12-17 ENCOUNTER — TELEPHONE (OUTPATIENT)
Dept: FAMILY MEDICINE CLINIC | Facility: CLINIC | Age: 54
End: 2019-12-17

## 2019-12-17 DIAGNOSIS — G43.009 MIGRAINE WITHOUT AURA AND WITHOUT STATUS MIGRAINOSUS, NOT INTRACTABLE: ICD-10-CM

## 2019-12-17 RX ORDER — RIZATRIPTAN BENZOATE 10 MG/1
10 TABLET ORAL ONCE
Qty: 10 TABLET | Refills: 0 | Status: SHIPPED | OUTPATIENT
Start: 2019-12-17 | End: 2019-12-17

## 2019-12-17 NOTE — TELEPHONE ENCOUNTER
See attached e-mail. APPROVE/DENY set up RX.    SCHEDULED 12/18/19 10am Dr. Brianna Esposito.    4/19/19 OV Dr. Coronado Legacy PHYSICAL/anxiety/hypothroidism/Vit D Def  1/20/19 last refill #10 (0)

## 2019-12-17 NOTE — TELEPHONE ENCOUNTER
Future Appointments   Date Time Provider Farzana Butler   12/18/2019 10:00 AM Anastasiya Dela Cruz MD [facility] [address] [facility] [address]     Pt would like to request a temp refill for her migraine meds.  Pt believes she is not able to get a refill until she see MD.

## 2019-12-18 ENCOUNTER — OFFICE VISIT (OUTPATIENT)
Dept: FAMILY MEDICINE CLINIC | Facility: CLINIC | Age: 54
End: 2019-12-18
Payer: COMMERCIAL

## 2019-12-18 VITALS
RESPIRATION RATE: 16 BRPM | TEMPERATURE: 98 F | WEIGHT: 200 LBS | BODY MASS INDEX: 31.39 KG/M2 | DIASTOLIC BLOOD PRESSURE: 74 MMHG | HEART RATE: 72 BPM | HEIGHT: 67 IN | SYSTOLIC BLOOD PRESSURE: 118 MMHG

## 2019-12-18 DIAGNOSIS — E66.9 OBESITY (BMI 30.0-34.9): ICD-10-CM

## 2019-12-18 DIAGNOSIS — Z85.3 HISTORY OF BREAST CANCER: ICD-10-CM

## 2019-12-18 DIAGNOSIS — F51.04 PSYCHOPHYSIOLOGICAL INSOMNIA: ICD-10-CM

## 2019-12-18 DIAGNOSIS — G43.009 MIGRAINE WITHOUT AURA AND WITHOUT STATUS MIGRAINOSUS, NOT INTRACTABLE: Primary | ICD-10-CM

## 2019-12-18 PROCEDURE — 99214 OFFICE O/P EST MOD 30 MIN: CPT | Performed by: FAMILY MEDICINE

## 2019-12-18 RX ORDER — TRAZODONE HYDROCHLORIDE 50 MG/1
50 TABLET ORAL NIGHTLY PRN
Qty: 30 TABLET | Refills: 0 | Status: SHIPPED | OUTPATIENT
Start: 2019-12-18 | End: 2020-02-17

## 2019-12-18 RX ORDER — RIZATRIPTAN BENZOATE 10 MG/1
10 TABLET ORAL ONCE
Qty: 10 TABLET | Refills: 1 | Status: SHIPPED | OUTPATIENT
Start: 2019-12-18 | End: 2020-03-23

## 2019-12-18 NOTE — PROGRESS NOTES
[facility] Group Visit Note  12/18/2019      Subjective:      Patient ID: Marisol Glez is a 48year old female.     Chief Complaint:  Patient presents with:  Migraine: yesterday had a horrible H/A  & nausea that started about 2:00 am. States she e Wellbutrin contraindicated with her tamoxifen. Tried buspirone in the past and did ok on it and would like to try again.  Her kids have ADHD and she sometimes wonders about herself.          Review of Systems - as stated above in the HPI      Objective:

## 2019-12-19 ENCOUNTER — TELEPHONE (OUTPATIENT)
Dept: FAMILY MEDICINE CLINIC | Facility: CLINIC | Age: 54
End: 2019-12-19

## 2019-12-19 NOTE — TELEPHONE ENCOUNTER
Left Digital Diagnostic Mammogram With Tomosynthesis Report / Date of exam 07/03/19 @ [address], ordering physician is DrShelli Bleacher Burnard Burkitt  received via fax. [facility] updated & report placed in Dr. Mary Hou in box for review.

## 2019-12-20 ENCOUNTER — TELEPHONE (OUTPATIENT)
Dept: FAMILY MEDICINE CLINIC | Facility: CLINIC | Age: 54
End: 2019-12-20

## 2019-12-20 PROBLEM — E66.811 OBESITY (BMI 30.0-34.9): Status: ACTIVE | Noted: 2019-12-20

## 2019-12-20 PROBLEM — E66.9 OBESITY (BMI 30.0-34.9): Status: ACTIVE | Noted: 2019-12-20

## 2019-12-23 NOTE — TELEPHONE ENCOUNTER
Spoke with Dr. Jamie Benites office, will leave a message for MD regarding Topiramate and Tamoxifen. [facility] RN or Dr. Wiliam Ellington will call office back with recommendations.

## 2020-01-02 NOTE — TELEPHONE ENCOUNTER
Left message with Dr. Hilaria Jovel office to review previous message regarding Topamax and Tamoxifen.

## 2020-01-13 NOTE — TELEPHONE ENCOUNTER
Spoke with Dr. Sandy Mckeon care team, spoke with Emily Tucker, advised that I have left multiple messages. Emily Tucker advised she will send a page message to Dr. Arlyss Cranker to return call regarding pt. Left cell # for call back.

## 2020-01-15 ENCOUNTER — TELEPHONE (OUTPATIENT)
Dept: FAMILY MEDICINE CLINIC | Facility: CLINIC | Age: 55
End: 2020-01-15

## 2020-01-15 DIAGNOSIS — G43.009 MIGRAINE WITHOUT AURA AND WITHOUT STATUS MIGRAINOSUS, NOT INTRACTABLE: Primary | ICD-10-CM

## 2020-01-15 RX ORDER — AMITRIPTYLINE HYDROCHLORIDE 10 MG/1
10 TABLET, FILM COATED ORAL NIGHTLY
Qty: 30 TABLET | Refills: 0 | Status: SHIPPED | OUTPATIENT
Start: 2020-01-15 | End: 2020-02-17

## 2020-01-15 RX ORDER — PHENTERMINE HYDROCHLORIDE 30 MG/1
30 CAPSULE ORAL EVERY MORNING
Qty: 30 CAPSULE | Refills: 0 | Status: SHIPPED | OUTPATIENT
Start: 2020-01-15 | End: 2020-02-17

## 2020-01-15 NOTE — TELEPHONE ENCOUNTER
Attempted to reach pt, [facility] requesting a return call to discuss

## 2020-01-15 NOTE — TELEPHONE ENCOUNTER
I spoke with Dr. Manuel Pino ([phone]) her new oncologist about potentially starting topiramate for her migraines and wt loss.  She says even at low dose it would be contraindicated as it lowers the efficacy of tamoxifen, but once off the tamoxifen would be

## 2020-01-15 NOTE — TELEPHONE ENCOUNTER
Patient informed of MD recommendations.  Patient would like to start Phentermine 30 mg. RX pended for approval     Future Appointments   Date Time Provider Farzana Butler   2/3/2020  1:00 PM Basil Trujillo DO [facility]   2/17/2020  2:20 PM Mar

## 2020-01-21 ENCOUNTER — APPOINTMENT (OUTPATIENT)
Dept: PHYSICAL THERAPY | Age: 55
End: 2020-01-21
Attending: ORTHOPAEDIC SURGERY
Payer: COMMERCIAL

## 2020-01-22 DIAGNOSIS — F51.04 PSYCHOPHYSIOLOGICAL INSOMNIA: ICD-10-CM

## 2020-01-23 RX ORDER — TRAZODONE HYDROCHLORIDE 50 MG/1
TABLET ORAL
Qty: 30 TABLET | Refills: 0 | OUTPATIENT
Start: 2020-01-23

## 2020-01-23 NOTE — TELEPHONE ENCOUNTER
Requested Prescriptions     Pending Prescriptions Disp Refills   • TRAZODONE HCL 50 MG Oral Tab [Pharmacy Med Name: TRAZODONE 50MG TABLETS] 30 tablet 0     Sig: TAKE 1 TABLET(50 MG) BY MOUTH EVERY NIGHT AS NEEDED FOR SLEEP     Refill request canceled per p

## 2020-01-28 ENCOUNTER — OFFICE VISIT (OUTPATIENT)
Dept: PHYSICAL THERAPY | Age: 55
End: 2020-01-28
Attending: ORTHOPAEDIC SURGERY
Payer: COMMERCIAL

## 2020-01-28 PROCEDURE — 97162 PT EVAL MOD COMPLEX 30 MIN: CPT

## 2020-01-28 PROCEDURE — 97110 THERAPEUTIC EXERCISES: CPT

## 2020-01-29 NOTE — PROGRESS NOTES
INITIAL EVALUATION:   Referring Physician: Dr. Jones ref.  provider found  Diagnosis: Adhesive Capsulitis Left Shoulder     Date of Service: 1/28/2020     PATIENT SUMMARY/ASSESSMENT   Abbi Fish is a 48year old y/o female who presents to therapy toda include  has a past medical history of Acquired hypothyroidism, Anesthesia complication, Anxiety, Asthma, Breast cancer ([facility] [address] (2014), Dense breast tissue on mammogram, Depression, Diarrhea, unspecified (15 years), Disorder of thyroid, Dysmenorrhea, [place] CARE:    Goals:    · Patient to demonstrate independence and compliance with comprehensive home exercise program (12 visits)  · Patient to demonstrate improved postural awareness, requiring no cueing during treatment session (12 visits)  · Patient to impro

## 2020-02-03 ENCOUNTER — OFFICE VISIT (OUTPATIENT)
Dept: PHYSICAL THERAPY | Age: 55
End: 2020-02-03
Attending: ORTHOPAEDIC SURGERY
Payer: COMMERCIAL

## 2020-02-03 PROCEDURE — 97140 MANUAL THERAPY 1/> REGIONS: CPT

## 2020-02-03 PROCEDURE — 97110 THERAPEUTIC EXERCISES: CPT

## 2020-02-04 NOTE — PROGRESS NOTES
Dx: Adhesive capsulitis left shoulder           Authorized # of Visits:  12  Fall Risk: standard         Precautions: n/a             Subjective: The patient reports she ordered a set of pulleys on Friday and hasn't gotten them yet.   Objective:   See mary

## 2020-02-05 ENCOUNTER — OFFICE VISIT (OUTPATIENT)
Dept: PHYSICAL THERAPY | Age: 55
End: 2020-02-05
Attending: ORTHOPAEDIC SURGERY
Payer: COMMERCIAL

## 2020-02-05 PROCEDURE — 97110 THERAPEUTIC EXERCISES: CPT

## 2020-02-05 PROCEDURE — 97140 MANUAL THERAPY 1/> REGIONS: CPT

## 2020-02-06 NOTE — PROGRESS NOTES
Dx: Adhesive capsulitis left shoulder           Authorized # of Visits:  12  Fall Risk: standard         Precautions: n/a             Subjective:    The patient reports she finally got her pulleys today, but hasn't had a chance to use them  Objective:   See

## 2020-02-10 ENCOUNTER — OFFICE VISIT (OUTPATIENT)
Dept: PHYSICAL THERAPY | Age: 55
End: 2020-02-10
Attending: ORTHOPAEDIC SURGERY
Payer: COMMERCIAL

## 2020-02-10 PROCEDURE — 97110 THERAPEUTIC EXERCISES: CPT

## 2020-02-11 NOTE — PROGRESS NOTES
Dx: Adhesive capsulitis left shoulder           Authorized # of Visits:  12  Fall Risk: standard         Precautions: n/a             Subjective:    The patient reports she has been working on her exercises at home and her shoulder has been feeling better passive ER/IR x 5 min -       HEP: S/L sleeper stretch, Self ER stretch, OH pulleys,     Charges:  TherEx x 3      Total Timed Treatment: 40 min  Total Treatment Time: 40 min

## 2020-02-12 ENCOUNTER — OFFICE VISIT (OUTPATIENT)
Dept: PHYSICAL THERAPY | Age: 55
End: 2020-02-12
Attending: ORTHOPAEDIC SURGERY
Payer: COMMERCIAL

## 2020-02-12 PROCEDURE — 97110 THERAPEUTIC EXERCISES: CPT

## 2020-02-13 NOTE — PROGRESS NOTES
Dx: Adhesive capsulitis left shoulder           Authorized # of Visits:  12  Fall Risk: standard         Precautions: n/a             Subjective:    The patient reports some soreness around her scapula after doing exercises this morning  Objective:   See fl joint distraction with passive ER/IR GH joint distraction with passive ER/IR x 5 min - -      HEP: S/L sleeper stretch, Self ER stretch, OH pulleys,     Charges:  TherEx x 3      Total Timed Treatment: 40 min  Total Treatment Time: 40 min

## 2020-02-17 ENCOUNTER — OFFICE VISIT (OUTPATIENT)
Dept: PHYSICAL THERAPY | Age: 55
End: 2020-02-17
Attending: ORTHOPAEDIC SURGERY
Payer: COMMERCIAL

## 2020-02-17 ENCOUNTER — OFFICE VISIT (OUTPATIENT)
Dept: FAMILY MEDICINE CLINIC | Facility: CLINIC | Age: 55
End: 2020-02-17
Payer: COMMERCIAL

## 2020-02-17 VITALS
WEIGHT: 188 LBS | BODY MASS INDEX: 29.51 KG/M2 | RESPIRATION RATE: 16 BRPM | HEIGHT: 67 IN | TEMPERATURE: 99 F | HEART RATE: 82 BPM | SYSTOLIC BLOOD PRESSURE: 118 MMHG | DIASTOLIC BLOOD PRESSURE: 70 MMHG

## 2020-02-17 DIAGNOSIS — F51.04 PSYCHOPHYSIOLOGICAL INSOMNIA: ICD-10-CM

## 2020-02-17 DIAGNOSIS — R79.89 ELEVATED SERUM CREATININE: ICD-10-CM

## 2020-02-17 DIAGNOSIS — E66.3 OVERWEIGHT ON EXAMINATION: ICD-10-CM

## 2020-02-17 DIAGNOSIS — G43.009 MIGRAINE WITHOUT AURA AND WITHOUT STATUS MIGRAINOSUS, NOT INTRACTABLE: Primary | ICD-10-CM

## 2020-02-17 DIAGNOSIS — M79.7 FIBROMYALGIA: ICD-10-CM

## 2020-02-17 PROCEDURE — 97110 THERAPEUTIC EXERCISES: CPT

## 2020-02-17 PROCEDURE — 99214 OFFICE O/P EST MOD 30 MIN: CPT | Performed by: FAMILY MEDICINE

## 2020-02-17 RX ORDER — AMITRIPTYLINE HYDROCHLORIDE 10 MG/1
10 TABLET, FILM COATED ORAL NIGHTLY
Qty: 90 TABLET | Refills: 1 | Status: SHIPPED | OUTPATIENT
Start: 2020-02-17 | End: 2020-11-13

## 2020-02-17 RX ORDER — PHENTERMINE HYDROCHLORIDE 30 MG/1
30 CAPSULE ORAL EVERY MORNING
Qty: 30 CAPSULE | Refills: 0 | Status: SHIPPED | OUTPATIENT
Start: 2020-02-17 | End: 2020-11-13

## 2020-02-17 RX ORDER — CITALOPRAM 10 MG/1
10 TABLET ORAL EVERY MORNING
COMMUNITY
Start: 2020-02-08 | End: 2020-11-13

## 2020-02-17 NOTE — PROGRESS NOTES
[facility] Visit Note  2/17/2020      Subjective:      Patient ID: Supriya Henry is a 48year old female. Chief Complaint:  Patient presents with:  Migraine: Amitriptyline added over the phone on 1/15/20 and it has really helped.   Weight 1/15/20    Denies-  chest pain, palpitations, sob, syncope, difficulty sleeping           Anxiety/Depression/Focus-  Now wondering if she has ADHD. 2 sons have it. Better focus with phentermine. Has an appt with psychiatrist to discuss this further.  Aware Amitriptyline HCl 10 MG Oral Tab; Take 1 tablet (10 mg total) by mouth nightly. Dispense: 90 tablet; Refill: 1    4. Overweight on examination  - Phentermine HCl 30 MG Oral Cap; Take 1 capsule (30 mg total) by mouth every morning.   Dispense: 30 capsule; R

## 2020-02-17 NOTE — PROGRESS NOTES
Dx: Adhesive capsulitis left shoulder           Authorized # of Visits:  12  Fall Risk: standard         Precautions: n/a             Subjective: The patient reports her shoulder has been feeling alright  Objective:   See flow sheet    Assessment:    The stretch 3 x 30 sec     GH joint AP/inferior glides at various positions GH joint AP/inferior glides at various positions/angles grade 3 x 5 min - Reviewed HEP Reviewed HEP     GH joint distraction with passive ER/IR GH joint distraction with passive ER/IR

## 2020-02-19 ENCOUNTER — OFFICE VISIT (OUTPATIENT)
Dept: PHYSICAL THERAPY | Age: 55
End: 2020-02-19
Attending: ORTHOPAEDIC SURGERY
Payer: COMMERCIAL

## 2020-02-19 PROCEDURE — 97110 THERAPEUTIC EXERCISES: CPT

## 2020-02-24 ENCOUNTER — OFFICE VISIT (OUTPATIENT)
Dept: PHYSICAL THERAPY | Age: 55
End: 2020-02-24
Attending: FAMILY MEDICINE
Payer: COMMERCIAL

## 2020-02-24 PROCEDURE — 97110 THERAPEUTIC EXERCISES: CPT

## 2020-02-25 NOTE — PROGRESS NOTES
Dx: Adhesive capsulitis left shoulder           Authorized # of Visits:  12  Fall Risk: standard         Precautions: n/a             Subjective:    The patient reports her shoulder has been feelin gok  Objective:   See flow sheet    Assessment:   The migdalia Red band scap retraction 2 x 15 Red band scap retraction 2 x 15 Blue band scap retraction 2 x 15   Yellow band bilateral ER with scap retraction 2 x 15 - Supine self ER stretch x 4 min Doorway rhomboid stretch 3 x 30 sec Supine wand shoulder flexion 2 x 15

## 2020-03-02 ENCOUNTER — TELEPHONE (OUTPATIENT)
Dept: PHYSICAL THERAPY | Age: 55
End: 2020-03-02

## 2020-03-02 ENCOUNTER — APPOINTMENT (OUTPATIENT)
Dept: PHYSICAL THERAPY | Age: 55
End: 2020-03-02
Attending: FAMILY MEDICINE
Payer: COMMERCIAL

## 2020-03-14 ENCOUNTER — OFFICE VISIT (OUTPATIENT)
Dept: FAMILY MEDICINE CLINIC | Facility: CLINIC | Age: 55
End: 2020-03-14
Payer: COMMERCIAL

## 2020-03-14 VITALS
RESPIRATION RATE: 16 BRPM | HEART RATE: 80 BPM | TEMPERATURE: 98 F | WEIGHT: 185.5 LBS | SYSTOLIC BLOOD PRESSURE: 100 MMHG | HEIGHT: 67 IN | DIASTOLIC BLOOD PRESSURE: 70 MMHG | BODY MASS INDEX: 29.11 KG/M2

## 2020-03-14 DIAGNOSIS — E66.3 OVERWEIGHT ON EXAMINATION: ICD-10-CM

## 2020-03-14 PROCEDURE — 99213 OFFICE O/P EST LOW 20 MIN: CPT | Performed by: FAMILY MEDICINE

## 2020-03-14 RX ORDER — PHENTERMINE HYDROCHLORIDE 30 MG/1
30 CAPSULE ORAL EVERY MORNING
Qty: 30 CAPSULE | Refills: 0 | Status: CANCELLED | OUTPATIENT
Start: 2020-03-14

## 2020-03-14 RX ORDER — METHYLPHENIDATE HYDROCHLORIDE 10 MG/1
TABLET ORAL
COMMUNITY
Start: 2020-03-07 | End: 2020-11-13 | Stop reason: DRUGHIGH

## 2020-03-14 RX ORDER — METFORMIN HYDROCHLORIDE 500 MG/1
TABLET, EXTENDED RELEASE ORAL
Qty: 90 TABLET | Refills: 0 | OUTPATIENT
Start: 2020-03-14

## 2020-03-14 RX ORDER — METFORMIN HYDROCHLORIDE 500 MG/1
500 TABLET, EXTENDED RELEASE ORAL
Qty: 30 TABLET | Refills: 0 | Status: SHIPPED | OUTPATIENT
Start: 2020-03-14 | End: 2020-11-13

## 2020-03-14 NOTE — PROGRESS NOTES
[facility] Group Visit Note  3/14/2020      Subjective:      Patient ID: Maryjane Navarro is a 50year old female.     Chief Complaint:  Patient presents with:  Weight Check      HPI:  Maryjane Navarro is a 50year old female who is being seen today Examination   General:  Alert, in no acute distress  HEENT: NCAT, EOMI, mucus membranes moist   Neck:  No cervical lymphadenopathy  CV: Regular rate and rhythm. No murmurs, gallops, or rubs.   Lungs:  Clear to auscultation B, no wheezes, rales, or rhonchi,

## 2020-03-22 DIAGNOSIS — G43.009 MIGRAINE WITHOUT AURA AND WITHOUT STATUS MIGRAINOSUS, NOT INTRACTABLE: ICD-10-CM

## 2020-03-23 RX ORDER — RIZATRIPTAN BENZOATE 10 MG/1
TABLET ORAL
Qty: 10 TABLET | Refills: 1 | Status: SHIPPED | OUTPATIENT
Start: 2020-03-23 | End: 2020-07-17

## 2020-03-31 ENCOUNTER — TELEPHONE (OUTPATIENT)
Dept: FAMILY MEDICINE CLINIC | Facility: CLINIC | Age: 55
End: 2020-03-31

## 2020-03-31 NOTE — TELEPHONE ENCOUNTER
Spoke to pt, symptoms started Tuesday, 3/24/20    Pt reports currently:  +fever, last evening 100.6, normal this AM  +cough, mild, clearing draining PND;coughs about 1-2x/hour  +body aches on and off with fever  +fatigue, past 1 week, increased with exerti

## 2020-04-01 NOTE — TELEPHONE ENCOUNTER
Symptoms started Tuesday, 3/24/20.  13 people have tested positive where she works. She believes she has it as well and feels ok for the most part. She declines testing and prefers to be treated as if she has it.      Sxs: Chills, myalgias, itchy throat  Mi so could end quarantine on Tues 4/7.  Letter written for her and for him.

## 2020-04-03 NOTE — TELEPHONE ENCOUNTER
Informed pt of Dr. Chuck Ordaz' review and recommendations, pt expressed understanding and agreement. Task completed.

## 2020-04-06 ENCOUNTER — TELEPHONE (OUTPATIENT)
Dept: FAMILY MEDICINE CLINIC | Facility: CLINIC | Age: 55
End: 2020-04-06

## 2020-04-06 NOTE — TELEPHONE ENCOUNTER
Sxs started 3/24. Feeling fine now since Wed. Her last elevated temp was in the 99's on Wed 4/1. tells me now she had loss of smell on Manuel 3/29   No other sxs since 4/1: chest pain, sob, wheezing, headaches, sore throat.  Called to double check that she

## 2020-04-08 RX ORDER — LEVOTHYROXINE SODIUM 0.12 MG/1
TABLET ORAL
Qty: 90 TABLET | Refills: 0 | Status: SHIPPED | OUTPATIENT
Start: 2020-04-08 | End: 2020-05-05

## 2020-04-08 NOTE — TELEPHONE ENCOUNTER
LOV: 3/14/20  RTC: 1 month  LABS: 4/19/19  FILLED: 4/23/19 # 90 with 3 refills    Lab Results   Component Value Date    T4F 1.0 10/26/2018    TSH 1.240 04/19/2019     : LEVOTHYROXINE 0.125MG (125MCG) TAB          Will file in chart as: LEVOTHYROXINE SODIUM

## 2020-04-17 DIAGNOSIS — E66.3 OVERWEIGHT ON EXAMINATION: ICD-10-CM

## 2020-04-17 RX ORDER — METFORMIN HYDROCHLORIDE 500 MG/1
TABLET, EXTENDED RELEASE ORAL
Qty: 30 TABLET | Refills: 0 | OUTPATIENT
Start: 2020-04-17

## 2020-04-17 NOTE — TELEPHONE ENCOUNTER
Future Appointments   Date Time Provider Farzana Butler   4/20/2020 10:00 AM Marcial Espinoza MD [facility] [address] [facility] [address]   8/6/2020  9:40 AM Bree Agudelo DO ON[facility]

## 2020-04-17 NOTE — TELEPHONE ENCOUNTER
Requested Prescriptions     Pending Prescriptions Disp Refills   • METFORMIN HCL  MG Oral Tablet 24 Hr [Pharmacy Med Name: METFORMIN ER 500MG 24HR TABS] 30 tablet 0     Sig: TAKE 1 TABLET(500 MG) BY MOUTH DAILY WITH BREAKFAST       LOV: 3/14/20  RTC:

## 2020-04-20 ENCOUNTER — VIRTUAL PHONE E/M (OUTPATIENT)
Dept: FAMILY MEDICINE CLINIC | Facility: CLINIC | Age: 55
End: 2020-04-20
Payer: COMMERCIAL

## 2020-04-20 DIAGNOSIS — J30.1 SEASONAL ALLERGIC RHINITIS DUE TO POLLEN: ICD-10-CM

## 2020-04-20 DIAGNOSIS — J45.21 MILD INTERMITTENT REACTIVE AIRWAY DISEASE WITH ACUTE EXACERBATION: ICD-10-CM

## 2020-04-20 DIAGNOSIS — R07.89 CHEST TIGHTNESS: Primary | ICD-10-CM

## 2020-04-20 DIAGNOSIS — Z20.822 CLOSE EXPOSURE TO COVID-19 VIRUS: ICD-10-CM

## 2020-04-20 PROCEDURE — 99212 OFFICE O/P EST SF 10 MIN: CPT | Performed by: FAMILY MEDICINE

## 2020-04-20 NOTE — PROGRESS NOTES
Virtual Telephone Check-In    Heidi Rush verbally consents to a Virtual/Telephone Check-In visit on 04/20/20. Patient understands and accepts financial responsibility for any deductible, co-insurance and/or co-pays associated with this service. feeling better.     True Come, MD

## 2020-05-04 RX ORDER — LEVOTHYROXINE SODIUM 0.12 MG/1
TABLET ORAL
Qty: 90 TABLET | Refills: 0 | OUTPATIENT
Start: 2020-05-04

## 2020-05-04 NOTE — TELEPHONE ENCOUNTER
Thyroid Supplements Protocol Failed5/2 1:26 PM   TSH test in past 12 months    TSH value between 0.350 and 5.500 IU/ml    Appointment in past 12 or next 3 months     Requesting LEVOTHYROXINE SODIUM 125 MCG  LOV: 3/14/20  RTC: 1 month  Last Relevant La

## 2020-05-05 ENCOUNTER — VIRTUAL PHONE E/M (OUTPATIENT)
Dept: FAMILY MEDICINE CLINIC | Facility: CLINIC | Age: 55
End: 2020-05-05
Payer: COMMERCIAL

## 2020-05-05 ENCOUNTER — TELEPHONE (OUTPATIENT)
Dept: FAMILY MEDICINE CLINIC | Facility: CLINIC | Age: 55
End: 2020-05-05

## 2020-05-05 VITALS — BODY MASS INDEX: 28 KG/M2 | WEIGHT: 177 LBS

## 2020-05-05 DIAGNOSIS — E03.9 ACQUIRED HYPOTHYROIDISM: ICD-10-CM

## 2020-05-05 DIAGNOSIS — E55.9 VITAMIN D DEFICIENCY: ICD-10-CM

## 2020-05-05 DIAGNOSIS — Z00.00 LABORATORY EXAM ORDERED AS PART OF ROUTINE GENERAL MEDICAL EXAMINATION: ICD-10-CM

## 2020-05-05 DIAGNOSIS — R06.02 SOB (SHORTNESS OF BREATH) ON EXERTION: ICD-10-CM

## 2020-05-05 DIAGNOSIS — E66.3 OVERWEIGHT WITH BODY MASS INDEX (BMI) OF 27 TO 27.9 IN ADULT: Primary | ICD-10-CM

## 2020-05-05 DIAGNOSIS — E78.5 DYSLIPIDEMIA: ICD-10-CM

## 2020-05-05 PROCEDURE — 99214 OFFICE O/P EST MOD 30 MIN: CPT | Performed by: FAMILY MEDICINE

## 2020-05-05 RX ORDER — LEVOTHYROXINE SODIUM 0.12 MG/1
125 TABLET ORAL DAILY
Qty: 90 TABLET | Refills: 0 | Status: SHIPPED | OUTPATIENT
Start: 2020-05-05 | End: 2020-07-24

## 2020-05-05 NOTE — TELEPHONE ENCOUNTER
Future Appointments   Date Time Provider Farzana Butler   5/5/2020  4:30 PM Shaji Kitchen MD [facility] [address] [facility] [address]   8/6/2020  9:40 AM DO WAQAS Benitez NPV     Patient verbally consents to a virtual/telephone check-in service for

## 2020-05-05 NOTE — PROGRESS NOTES
Virtual Telephone Check-In    Jessy Shaver jeane consents to a Virtual/Telephone Check-In visit on  05/05/20.     Patient understands and accepts financial responsibility for any deductible, co-insurance and/or co-pays associated with this service (irritable bowel syndrome)    • Migraines    • PONV (postoperative nausea and vomiting)    • Reactive airway disease     exercise, ? allergies?    • Sjogren's syndrome [facility])     sees Dr. Jan Quiroz   • Visual impairment     glasses   • Vitamin D insufficiency 201 of this visit as physical examination was limited. Appropriate medical decision-making and tests are ordered as detailed in the plan of care above.

## 2020-07-17 DIAGNOSIS — G43.009 MIGRAINE WITHOUT AURA AND WITHOUT STATUS MIGRAINOSUS, NOT INTRACTABLE: ICD-10-CM

## 2020-07-20 NOTE — TELEPHONE ENCOUNTER
Requesting       RIZATRIPTAN BENZOATE 10 MG  LOV: 5/5/20  RTC:   Last Relevant Labs: lab orders pending   Filled: 3/23/20 # 10  with 1 refills    Future Appointments   Date Time Provider Farzana Butler   8/6/2020  9:40 AM Jeanine Both, DO ON[facility]

## 2020-07-21 RX ORDER — RIZATRIPTAN BENZOATE 10 MG/1
TABLET ORAL
Qty: 10 TABLET | Refills: 1 | Status: SHIPPED | OUTPATIENT
Start: 2020-07-21 | End: 2021-03-04

## 2020-07-24 DIAGNOSIS — E03.9 ACQUIRED HYPOTHYROIDISM: ICD-10-CM

## 2020-07-27 DIAGNOSIS — E03.9 ACQUIRED HYPOTHYROIDISM: ICD-10-CM

## 2020-07-27 RX ORDER — LEVOTHYROXINE SODIUM 0.12 MG/1
TABLET ORAL
Qty: 90 TABLET | Refills: 0 | Status: SHIPPED | OUTPATIENT
Start: 2020-07-27 | End: 2020-08-10

## 2020-07-27 RX ORDER — LEVOTHYROXINE SODIUM 0.12 MG/1
125 TABLET ORAL DAILY
Qty: 30 TABLET | Refills: 0 | Status: SHIPPED | OUTPATIENT
Start: 2020-07-27 | End: 2020-07-27

## 2020-07-27 NOTE — TELEPHONE ENCOUNTER
Requested Prescriptions     Pending Prescriptions Disp Refills   • LEVOTHYROXINE SODIUM 125 MCG Oral Tab [Pharmacy Med Name: LEVOTHYROXINE 0.125MG (125MCG) TAB] 90 tablet 0     Sig: TAKE 1 TABLET BY MOUTH DAILY.  PLEASE GET LABS BEFORE NEXT REFILL     LOV:

## 2020-07-27 NOTE — TELEPHONE ENCOUNTER
LOV: 5/5/20 Virtual Phone Visit  New lab orders are on chart.     Future Appointments   Date Time Provider Farzana Butler   8/6/2020  9:40 AM Abi Wells DO [facility]     Lab Results   Component Value Date    T4F 1.0 10/26/2018    TSH 1.240

## 2020-07-28 ENCOUNTER — TELEPHONE (OUTPATIENT)
Dept: FAMILY MEDICINE CLINIC | Facility: CLINIC | Age: 55
End: 2020-07-28

## 2020-07-28 NOTE — TELEPHONE ENCOUNTER
- Rec'd incoming fax request from [employer] on 07/27/2020 [employer].  Marked URGENT. Sent to [facility]. Via fax.

## 2020-07-31 ENCOUNTER — TELEPHONE (OUTPATIENT)
Dept: FAMILY MEDICINE CLINIC | Facility: CLINIC | Age: 55
End: 2020-07-31

## 2020-07-31 NOTE — TELEPHONE ENCOUNTER
Recd request and authorization for release of medical records from [address], [place]. Faxed to [employer] for processing.

## 2020-08-06 LAB — AMB EXT TSH: 1.97 MIU/ML

## 2020-08-10 ENCOUNTER — TELEPHONE (OUTPATIENT)
Dept: FAMILY MEDICINE CLINIC | Facility: CLINIC | Age: 55
End: 2020-08-10

## 2020-08-10 DIAGNOSIS — E03.9 ACQUIRED HYPOTHYROIDISM: ICD-10-CM

## 2020-08-10 NOTE — TELEPHONE ENCOUNTER
Please let pt know her thyroid studies were normal. Please refill her thyroid medication 90d supply with 3 RFs. Results sent to scan and abstracted.

## 2020-08-14 RX ORDER — LEVOTHYROXINE SODIUM 0.12 MG/1
125 TABLET ORAL DAILY
Qty: 90 TABLET | Refills: 3 | Status: SHIPPED | OUTPATIENT
Start: 2020-08-14 | End: 2021-11-11

## 2020-08-14 NOTE — TELEPHONE ENCOUNTER
Also next, time do the refill as a verbal order, don't send it back to me to sign, please. I had this open order and wasn't sure why still open and I see now you were likely waiting on me. Sorry, so verbal order next time please.  Thank you

## 2020-10-05 DIAGNOSIS — E03.9 ACQUIRED HYPOTHYROIDISM: ICD-10-CM

## 2020-10-05 RX ORDER — LEVOTHYROXINE SODIUM 0.12 MG/1
TABLET ORAL
Qty: 90 TABLET | Refills: 3 | OUTPATIENT
Start: 2020-10-05

## 2020-11-13 ENCOUNTER — OFFICE VISIT (OUTPATIENT)
Dept: FAMILY MEDICINE CLINIC | Facility: CLINIC | Age: 55
End: 2020-11-13
Payer: COMMERCIAL

## 2020-11-13 VITALS
HEART RATE: 63 BPM | SYSTOLIC BLOOD PRESSURE: 110 MMHG | BODY MASS INDEX: 29.66 KG/M2 | OXYGEN SATURATION: 98 % | DIASTOLIC BLOOD PRESSURE: 70 MMHG | HEIGHT: 67 IN | WEIGHT: 189 LBS | RESPIRATION RATE: 16 BRPM | TEMPERATURE: 98 F

## 2020-11-13 DIAGNOSIS — Z92.29 HISTORY OF TAMOXIFEN THERAPY: ICD-10-CM

## 2020-11-13 DIAGNOSIS — N93.9 ABNORMAL UTERINE BLEEDING (AUB): Primary | ICD-10-CM

## 2020-11-13 PROCEDURE — 99214 OFFICE O/P EST MOD 30 MIN: CPT | Performed by: FAMILY MEDICINE

## 2020-11-13 PROCEDURE — 3074F SYST BP LT 130 MM HG: CPT | Performed by: FAMILY MEDICINE

## 2020-11-13 PROCEDURE — 3078F DIAST BP <80 MM HG: CPT | Performed by: FAMILY MEDICINE

## 2020-11-13 PROCEDURE — 3008F BODY MASS INDEX DOCD: CPT | Performed by: FAMILY MEDICINE

## 2020-11-13 PROCEDURE — 99072 ADDL SUPL MATRL&STAF TM PHE: CPT | Performed by: FAMILY MEDICINE

## 2020-11-13 RX ORDER — LORAZEPAM 0.5 MG/1
0.5 TABLET ORAL AS NEEDED
COMMUNITY
Start: 2020-10-24

## 2020-11-13 RX ORDER — METHYLPHENIDATE HYDROCHLORIDE 27 MG/1
27 TABLET ORAL EVERY MORNING
COMMUNITY
Start: 2020-10-20

## 2020-11-13 RX ORDER — FLUOXETINE HYDROCHLORIDE 20 MG/1
20 CAPSULE ORAL DAILY
COMMUNITY
Start: 2020-10-05 | End: 2022-02-01

## 2020-11-13 NOTE — PROGRESS NOTES
[facility] Group Visit Note  11/13/2020      Subjective:      Patient ID: Supriya Henry is a 50year old female.     Chief Complaint:  Patient presents with:  Menstrual Problem: States she has been having her menstral cycle for 3 weeks, prior to th cancer and no heavy bleeding will forgo medroxyprogesterone  -discussed getting objective information about her condition first and will direct her treatment accordingly. Return for we will call with results.

## 2020-11-18 ENCOUNTER — HOSPITAL ENCOUNTER (OUTPATIENT)
Dept: ULTRASOUND IMAGING | Age: 55
Discharge: HOME | End: 2020-11-18
Attending: FAMILY MEDICINE
Payer: COMMERCIAL

## 2020-11-18 DIAGNOSIS — N93.9 ABNORMAL UTERINE BLEEDING (AUB): ICD-10-CM

## 2020-11-18 DIAGNOSIS — Z92.29 HISTORY OF TAMOXIFEN THERAPY: ICD-10-CM

## 2020-11-18 PROCEDURE — 76830 TRANSVAGINAL US NON-OB: CPT | Performed by: FAMILY MEDICINE

## 2020-11-18 PROCEDURE — 76856 US EXAM PELVIC COMPLETE: CPT | Performed by: FAMILY MEDICINE

## 2021-03-03 DIAGNOSIS — G43.009 MIGRAINE WITHOUT AURA AND WITHOUT STATUS MIGRAINOSUS, NOT INTRACTABLE: ICD-10-CM

## 2021-03-03 NOTE — TELEPHONE ENCOUNTER
RIZATRIPTAN 10MG TABLETS          Will file in chart as: RIZATRIPTAN BENZOATE 10 MG Oral Tab    Sig: TAKE 1 TABLET(10 MG) BY MOUTH 1 TIME FOR 1 DOSE FOR MIGRAINE. MAY REPEAT IN 2 HOUR FOR 2.  MAX 30 MG/ 24 HOUR    Disp:  10 tablet    Refills:  1 (Pharmacy r

## 2021-03-04 RX ORDER — RIZATRIPTAN BENZOATE 10 MG/1
TABLET ORAL
Qty: 10 TABLET | Refills: 1 | Status: SHIPPED | OUTPATIENT
Start: 2021-03-04 | End: 2021-12-27

## 2021-11-11 DIAGNOSIS — E03.9 ACQUIRED HYPOTHYROIDISM: ICD-10-CM

## 2021-11-11 RX ORDER — LEVOTHYROXINE SODIUM 0.12 MG/1
TABLET ORAL
Qty: 90 TABLET | Refills: 3 | Status: SHIPPED | OUTPATIENT
Start: 2021-11-11

## 2021-12-27 DIAGNOSIS — G43.009 MIGRAINE WITHOUT AURA AND WITHOUT STATUS MIGRAINOSUS, NOT INTRACTABLE: ICD-10-CM

## 2021-12-27 DIAGNOSIS — F41.9 ANXIETY: ICD-10-CM

## 2021-12-27 RX ORDER — ALPRAZOLAM 0.25 MG/1
TABLET ORAL
Qty: 30 TABLET | Refills: 0 | Status: SHIPPED | OUTPATIENT
Start: 2021-12-27

## 2021-12-27 RX ORDER — RIZATRIPTAN BENZOATE 10 MG/1
10 TABLET ORAL AS NEEDED
Qty: 10 TABLET | Refills: 1 | Status: SHIPPED | OUTPATIENT
Start: 2021-12-27

## 2022-01-06 NOTE — TELEPHONE ENCOUNTER
Okay to see on Monday but if patient is having any worsening or return of her sxs ; she needs to go the ED.    Spoke to pt, she gave an update on her symptoms:  Pt states, \"Feeling better today\".     +cough, productive, mild  +PND, mild    No fever  No SOB  No chest pain    Family without any symptoms    Advised to continue full treatment plan and to continue rest

## 2022-02-01 ENCOUNTER — TELEPHONE (OUTPATIENT)
Dept: FAMILY MEDICINE CLINIC | Facility: CLINIC | Age: 57
End: 2022-02-01

## 2022-02-01 ENCOUNTER — OFFICE VISIT (OUTPATIENT)
Dept: FAMILY MEDICINE CLINIC | Facility: CLINIC | Age: 57
End: 2022-02-01
Payer: COMMERCIAL

## 2022-02-01 VITALS
HEIGHT: 65 IN | WEIGHT: 211 LBS | RESPIRATION RATE: 16 BRPM | SYSTOLIC BLOOD PRESSURE: 112 MMHG | OXYGEN SATURATION: 96 % | BODY MASS INDEX: 35.16 KG/M2 | HEART RATE: 69 BPM | TEMPERATURE: 98 F | DIASTOLIC BLOOD PRESSURE: 70 MMHG

## 2022-02-01 DIAGNOSIS — Z01.818 PRE-OPERATIVE EXAMINATION: ICD-10-CM

## 2022-02-01 DIAGNOSIS — E03.9 ACQUIRED HYPOTHYROIDISM: ICD-10-CM

## 2022-02-01 DIAGNOSIS — N92.0 EXCESSIVE AND FREQUENT MENSTRUATION WITH REGULAR CYCLE: Primary | ICD-10-CM

## 2022-02-01 DIAGNOSIS — E78.2 MIXED HYPERLIPIDEMIA: ICD-10-CM

## 2022-02-01 PROCEDURE — 93000 ELECTROCARDIOGRAM COMPLETE: CPT | Performed by: FAMILY MEDICINE

## 2022-02-01 PROCEDURE — 99214 OFFICE O/P EST MOD 30 MIN: CPT | Performed by: FAMILY MEDICINE

## 2022-02-01 PROCEDURE — 3008F BODY MASS INDEX DOCD: CPT | Performed by: FAMILY MEDICINE

## 2022-02-01 PROCEDURE — 3078F DIAST BP <80 MM HG: CPT | Performed by: FAMILY MEDICINE

## 2022-02-01 PROCEDURE — 3074F SYST BP LT 130 MM HG: CPT | Performed by: FAMILY MEDICINE

## 2022-02-01 RX ORDER — METHYLPHENIDATE HYDROCHLORIDE 5 MG/1
5 TABLET ORAL DAILY
COMMUNITY
Start: 2022-01-04

## 2022-02-01 NOTE — TELEPHONE ENCOUNTER
Scheduled 2/8/22 for Robotic assisted laparoscopic hysterectomy bilateral salpingectomy oophorectomy with Dr. Kristyn Sellers at [facility] office visit notes/medical clearance & Ekg done in office today all faxed to Dr. Jackie Santa office fax # [phone]. Fax confirmation received. Copy of all the paperwork filed in Dr. González Tinajero pre-op folder.

## 2022-02-03 ENCOUNTER — TELEPHONE (OUTPATIENT)
Dept: FAMILY MEDICINE CLINIC | Facility: CLINIC | Age: 57
End: 2022-02-03

## 2022-02-03 NOTE — TELEPHONE ENCOUNTER
Received via fax the left breast screening mammogram reports done on 12/14/21 & 10/12/20 [facility] in [place]. Health maintenance updated & report placed in Dr. Atul Vick in box for review.

## 2022-04-25 ENCOUNTER — LAB ENCOUNTER (OUTPATIENT)
Dept: LAB | Age: 57
End: 2022-04-25
Attending: FAMILY MEDICINE
Payer: COMMERCIAL

## 2022-04-25 ENCOUNTER — OFFICE VISIT (OUTPATIENT)
Dept: FAMILY MEDICINE CLINIC | Facility: CLINIC | Age: 57
End: 2022-04-25
Payer: COMMERCIAL

## 2022-04-25 ENCOUNTER — PATIENT MESSAGE (OUTPATIENT)
Dept: FAMILY MEDICINE CLINIC | Facility: CLINIC | Age: 57
End: 2022-04-25

## 2022-04-25 VITALS
SYSTOLIC BLOOD PRESSURE: 122 MMHG | BODY MASS INDEX: 34.55 KG/M2 | WEIGHT: 215 LBS | HEART RATE: 65 BPM | OXYGEN SATURATION: 97 % | TEMPERATURE: 98 F | DIASTOLIC BLOOD PRESSURE: 70 MMHG | HEIGHT: 66 IN | RESPIRATION RATE: 16 BRPM

## 2022-04-25 DIAGNOSIS — E55.9 VITAMIN D DEFICIENCY: ICD-10-CM

## 2022-04-25 DIAGNOSIS — Z00.00 ROUTINE MEDICAL EXAM: Primary | ICD-10-CM

## 2022-04-25 DIAGNOSIS — Z00.00 LABORATORY EXAM ORDERED AS PART OF ROUTINE GENERAL MEDICAL EXAMINATION: ICD-10-CM

## 2022-04-25 DIAGNOSIS — Z12.31 ENCOUNTER FOR SCREENING MAMMOGRAM FOR MALIGNANT NEOPLASM OF BREAST: ICD-10-CM

## 2022-04-25 DIAGNOSIS — R76.8 DS DNA ANTIBODY POSITIVE: ICD-10-CM

## 2022-04-25 DIAGNOSIS — R76.8 POSITIVE ANA (ANTINUCLEAR ANTIBODY): ICD-10-CM

## 2022-04-25 DIAGNOSIS — D05.11 DUCTAL CARCINOMA IN SITU (DCIS) OF RIGHT BREAST: ICD-10-CM

## 2022-04-25 DIAGNOSIS — Z12.11 COLON CANCER SCREENING: ICD-10-CM

## 2022-04-25 DIAGNOSIS — E03.9 ACQUIRED HYPOTHYROIDISM: ICD-10-CM

## 2022-04-25 DIAGNOSIS — E66.9 OBESITY (BMI 30.0-34.9): ICD-10-CM

## 2022-04-25 PROBLEM — E66.3 OVERWEIGHT ON EXAMINATION: Status: RESOLVED | Noted: 2020-02-17 | Resolved: 2022-04-25

## 2022-04-25 LAB
ALBUMIN SERPL-MCNC: 4 G/DL (ref 3.4–5)
ALBUMIN/GLOB SERPL: 1.4 {RATIO} (ref 1–2)
ALP LIVER SERPL-CCNC: 54 U/L
ALT SERPL-CCNC: 25 U/L
ANION GAP SERPL CALC-SCNC: 6 MMOL/L (ref 0–18)
AST SERPL-CCNC: 19 U/L (ref 15–37)
BASOPHILS # BLD AUTO: 0.02 X10(3) UL (ref 0–0.2)
BASOPHILS NFR BLD AUTO: 0.4 %
BILIRUB SERPL-MCNC: 0.5 MG/DL (ref 0.1–2)
BUN BLD-MCNC: 16 MG/DL (ref 7–18)
C3 SERPL-MCNC: 122 MG/DL (ref 90–180)
C4 SERPL-MCNC: 27.1 MG/DL (ref 10–40)
CALCIUM BLD-MCNC: 9.1 MG/DL (ref 8.5–10.1)
CHLORIDE SERPL-SCNC: 108 MMOL/L (ref 98–112)
CHOLEST SERPL-MCNC: 228 MG/DL (ref ?–200)
CO2 SERPL-SCNC: 27 MMOL/L (ref 21–32)
CREAT BLD-MCNC: 0.83 MG/DL
EOSINOPHIL # BLD AUTO: 0.18 X10(3) UL (ref 0–0.7)
EOSINOPHIL NFR BLD AUTO: 3.7 %
ERYTHROCYTE [DISTWIDTH] IN BLOOD BY AUTOMATED COUNT: 13.1 %
FASTING PATIENT LIPID ANSWER: YES
FASTING STATUS PATIENT QL REPORTED: YES
GLOBULIN PLAS-MCNC: 2.9 G/DL (ref 2.8–4.4)
GLUCOSE BLD-MCNC: 93 MG/DL (ref 70–99)
HCT VFR BLD AUTO: 42.5 %
HDLC SERPL-MCNC: 70 MG/DL (ref 40–59)
HGB BLD-MCNC: 13.9 G/DL
IMM GRANULOCYTES # BLD AUTO: 0.02 X10(3) UL (ref 0–1)
IMM GRANULOCYTES NFR BLD: 0.4 %
LDLC SERPL CALC-MCNC: 137 MG/DL (ref ?–100)
LYMPHOCYTES # BLD AUTO: 1.26 X10(3) UL (ref 1–4)
LYMPHOCYTES NFR BLD AUTO: 25.8 %
MCH RBC QN AUTO: 30.2 PG (ref 26–34)
MCHC RBC AUTO-ENTMCNC: 32.7 G/DL (ref 31–37)
MCV RBC AUTO: 92.4 FL
MONOCYTES # BLD AUTO: 0.61 X10(3) UL (ref 0.1–1)
MONOCYTES NFR BLD AUTO: 12.5 %
NEUTROPHILS # BLD AUTO: 2.8 X10 (3) UL (ref 1.5–7.7)
NEUTROPHILS # BLD AUTO: 2.8 X10(3) UL (ref 1.5–7.7)
NEUTROPHILS NFR BLD AUTO: 57.2 %
NONHDLC SERPL-MCNC: 158 MG/DL (ref ?–130)
OSMOLALITY SERPL CALC.SUM OF ELEC: 293 MOSM/KG (ref 275–295)
PLATELET # BLD AUTO: 240 10(3)UL (ref 150–450)
POTASSIUM SERPL-SCNC: 4.4 MMOL/L (ref 3.5–5.1)
PROT SERPL-MCNC: 6.9 G/DL (ref 6.4–8.2)
RBC # BLD AUTO: 4.6 X10(6)UL
SODIUM SERPL-SCNC: 141 MMOL/L (ref 136–145)
TRIGL SERPL-MCNC: 118 MG/DL (ref 30–149)
VIT D+METAB SERPL-MCNC: 31.8 NG/ML (ref 30–100)
VLDLC SERPL CALC-MCNC: 22 MG/DL (ref 0–30)
WBC # BLD AUTO: 4.9 X10(3) UL (ref 4–11)

## 2022-04-25 PROCEDURE — 80061 LIPID PANEL: CPT | Performed by: FAMILY MEDICINE

## 2022-04-25 PROCEDURE — 80053 COMPREHEN METABOLIC PANEL: CPT | Performed by: FAMILY MEDICINE

## 2022-04-25 PROCEDURE — 99396 PREV VISIT EST AGE 40-64: CPT | Performed by: FAMILY MEDICINE

## 2022-04-25 PROCEDURE — 3074F SYST BP LT 130 MM HG: CPT | Performed by: FAMILY MEDICINE

## 2022-04-25 PROCEDURE — 85025 COMPLETE CBC W/AUTO DIFF WBC: CPT | Performed by: FAMILY MEDICINE

## 2022-04-25 PROCEDURE — 3078F DIAST BP <80 MM HG: CPT | Performed by: FAMILY MEDICINE

## 2022-04-25 PROCEDURE — 3008F BODY MASS INDEX DOCD: CPT | Performed by: FAMILY MEDICINE

## 2022-04-25 PROCEDURE — 82306 VITAMIN D 25 HYDROXY: CPT | Performed by: FAMILY MEDICINE

## 2022-04-25 RX ORDER — METHYLPHENIDATE HYDROCHLORIDE 36 MG/1
36 TABLET ORAL DAILY
COMMUNITY
Start: 2022-04-21

## 2022-04-25 RX ORDER — BUPROPION HYDROCHLORIDE 150 MG/1
150 TABLET ORAL DAILY
Qty: 30 TABLET | Refills: 0 | Status: SHIPPED | OUTPATIENT
Start: 2022-04-25

## 2022-04-25 NOTE — TELEPHONE ENCOUNTER
From: Mc Helms  To: Arun Mullins MD  Sent: 4/25/2022 1:21 PM CDT  Subject: signed physician screening form    Hi Dr. Yoav Mack,  I attached the signed form for my biometric screening. I tried to fax it as well. Not sure if it came through. I hope it did!   Thanks,  Terence Schmidt  [phone]

## 2022-04-27 ENCOUNTER — TELEPHONE (OUTPATIENT)
Dept: FAMILY MEDICINE CLINIC | Facility: CLINIC | Age: 57
End: 2022-04-27

## 2022-04-27 NOTE — TELEPHONE ENCOUNTER
Spoke to patient and let her know that completed form was faxed to [employer] at [phone]-[email or web address] recd. Copy mailed to patient, copy sent to scan, and copy placed in blue accordion at  copy/fax machine.

## 2022-05-20 ENCOUNTER — OFFICE VISIT (OUTPATIENT)
Dept: FAMILY MEDICINE CLINIC | Facility: CLINIC | Age: 57
End: 2022-05-20
Payer: COMMERCIAL

## 2022-05-20 VITALS
SYSTOLIC BLOOD PRESSURE: 120 MMHG | TEMPERATURE: 98 F | OXYGEN SATURATION: 97 % | HEART RATE: 76 BPM | HEIGHT: 66 IN | BODY MASS INDEX: 34.72 KG/M2 | RESPIRATION RATE: 16 BRPM | WEIGHT: 216 LBS | DIASTOLIC BLOOD PRESSURE: 70 MMHG

## 2022-05-20 DIAGNOSIS — E66.9 OBESITY (BMI 30.0-34.9): Primary | ICD-10-CM

## 2022-05-20 DIAGNOSIS — F41.9 ANXIETY: ICD-10-CM

## 2022-05-20 PROCEDURE — 3074F SYST BP LT 130 MM HG: CPT | Performed by: FAMILY MEDICINE

## 2022-05-20 PROCEDURE — 3078F DIAST BP <80 MM HG: CPT | Performed by: FAMILY MEDICINE

## 2022-05-20 PROCEDURE — 99213 OFFICE O/P EST LOW 20 MIN: CPT | Performed by: FAMILY MEDICINE

## 2022-05-20 PROCEDURE — 3008F BODY MASS INDEX DOCD: CPT | Performed by: FAMILY MEDICINE

## 2022-05-20 RX ORDER — BUPROPION HYDROCHLORIDE 150 MG/1
150 TABLET ORAL DAILY
Qty: 90 TABLET | Refills: 0 | Status: SHIPPED | OUTPATIENT
Start: 2022-05-20

## 2022-07-30 ENCOUNTER — HOSPITAL ENCOUNTER (OUTPATIENT)
Dept: GENERAL RADIOLOGY | Age: 57
Discharge: HOME | End: 2022-07-30
Attending: INTERNAL MEDICINE
Payer: COMMERCIAL

## 2022-07-30 DIAGNOSIS — Z79.899 LONG-TERM USE OF HIGH-RISK MEDICATION: ICD-10-CM

## 2022-07-30 DIAGNOSIS — M35.00 PRIMARY SJOGREN'S SYNDROME (HCC): ICD-10-CM

## 2022-07-30 DIAGNOSIS — E55.9 VITAMIN D DEFICIENCY: ICD-10-CM

## 2022-07-30 DIAGNOSIS — M35.9 UNDIFFERENTIATED CONNECTIVE TISSUE DISEASE (HCC): ICD-10-CM

## 2022-07-30 DIAGNOSIS — R76.8 POSITIVE ANA (ANTINUCLEAR ANTIBODY): ICD-10-CM

## 2022-07-30 PROCEDURE — 77077 JOINT SURVEY SINGLE VIEW: CPT | Performed by: INTERNAL MEDICINE

## 2022-08-19 ENCOUNTER — OFFICE VISIT (OUTPATIENT)
Dept: FAMILY MEDICINE CLINIC | Facility: CLINIC | Age: 57
End: 2022-08-19
Payer: COMMERCIAL

## 2022-08-19 VITALS
OXYGEN SATURATION: 98 % | HEART RATE: 72 BPM | WEIGHT: 218 LBS | SYSTOLIC BLOOD PRESSURE: 114 MMHG | BODY MASS INDEX: 35.03 KG/M2 | TEMPERATURE: 98 F | DIASTOLIC BLOOD PRESSURE: 70 MMHG | HEIGHT: 66 IN | RESPIRATION RATE: 16 BRPM

## 2022-08-19 DIAGNOSIS — E66.09 CLASS 2 OBESITY DUE TO EXCESS CALORIES WITHOUT SERIOUS COMORBIDITY WITH BODY MASS INDEX (BMI) OF 35.0 TO 35.9 IN ADULT: Primary | ICD-10-CM

## 2022-08-19 DIAGNOSIS — F41.9 ANXIETY: ICD-10-CM

## 2022-08-19 PROBLEM — E66.812 CLASS 2 OBESITY DUE TO EXCESS CALORIES WITHOUT SERIOUS COMORBIDITY WITH BODY MASS INDEX (BMI) OF 35.0 TO 35.9 IN ADULT: Status: ACTIVE | Noted: 2022-08-19

## 2022-08-19 PROCEDURE — 99213 OFFICE O/P EST LOW 20 MIN: CPT | Performed by: FAMILY MEDICINE

## 2022-08-19 PROCEDURE — 3078F DIAST BP <80 MM HG: CPT | Performed by: FAMILY MEDICINE

## 2022-08-19 PROCEDURE — 3008F BODY MASS INDEX DOCD: CPT | Performed by: FAMILY MEDICINE

## 2022-08-19 PROCEDURE — 3074F SYST BP LT 130 MM HG: CPT | Performed by: FAMILY MEDICINE

## 2022-08-19 RX ORDER — ALPRAZOLAM 0.25 MG/1
0.25 TABLET ORAL NIGHTLY PRN
Qty: 30 TABLET | Refills: 0 | Status: SHIPPED | OUTPATIENT
Start: 2022-08-19

## 2022-08-19 RX ORDER — BUPROPION HYDROCHLORIDE 150 MG/1
150 TABLET ORAL DAILY
Qty: 90 TABLET | Refills: 1 | Status: SHIPPED | OUTPATIENT
Start: 2022-08-19

## 2022-08-29 DIAGNOSIS — G43.009 MIGRAINE WITHOUT AURA AND WITHOUT STATUS MIGRAINOSUS, NOT INTRACTABLE: ICD-10-CM

## 2022-08-30 RX ORDER — RIZATRIPTAN BENZOATE 10 MG/1
10 TABLET ORAL AS NEEDED
Qty: 10 TABLET | Refills: 1 | Status: SHIPPED | OUTPATIENT
Start: 2022-08-30

## 2022-12-21 ENCOUNTER — HOSPITAL ENCOUNTER (OUTPATIENT)
Dept: MRI IMAGING | Age: 57
Discharge: HOME | End: 2022-12-21
Attending: FAMILY MEDICINE
Payer: COMMERCIAL

## 2022-12-21 DIAGNOSIS — M54.2 CHRONIC NECK PAIN: ICD-10-CM

## 2022-12-21 DIAGNOSIS — G43.709 CHRONIC MIGRAINE W/O AURA W/O STATUS MIGRAINOSUS, NOT INTRACTABLE: ICD-10-CM

## 2022-12-21 DIAGNOSIS — G89.29 CHRONIC NECK PAIN: ICD-10-CM

## 2022-12-21 PROCEDURE — 70553 MRI BRAIN STEM W/O & W/DYE: CPT | Performed by: FAMILY MEDICINE

## 2022-12-21 PROCEDURE — A9575 INJ GADOTERATE MEGLUMI 0.1ML: HCPCS | Performed by: RADIOLOGY

## 2022-12-21 RX ORDER — GADOTERATE MEGLUMINE 376.9 MG/ML
20 INJECTION INTRAVENOUS
Status: COMPLETED | OUTPATIENT
Start: 2022-12-21 | End: 2022-12-21

## 2022-12-21 RX ADMIN — GADOTERATE MEGLUMINE 20 ML: 376.9 INJECTION INTRAVENOUS at 10:31:00

## 2022-12-22 DIAGNOSIS — E55.9 VITAMIN D DEFICIENCY: Primary | ICD-10-CM

## 2022-12-22 DIAGNOSIS — Z00.00 LABORATORY EXAM ORDERED AS PART OF ROUTINE GENERAL MEDICAL EXAMINATION: ICD-10-CM

## 2022-12-22 DIAGNOSIS — E03.9 ACQUIRED HYPOTHYROIDISM: ICD-10-CM

## 2022-12-28 RX ORDER — LEVOTHYROXINE SODIUM 0.12 MG/1
125 TABLET ORAL DAILY
Qty: 90 TABLET | Refills: 0 | Status: SHIPPED | OUTPATIENT
Start: 2022-12-28

## 2022-12-28 NOTE — TELEPHONE ENCOUNTER
Technically is due for repeat thyroid labs in Jan, but alll the rest are due in April. I placed orders for lab, please get them done before needing her next thyroid med refill.  I refilled a 90d supply

## 2023-01-08 ENCOUNTER — OFFICE VISIT (OUTPATIENT)
Dept: FAMILY MEDICINE CLINIC | Facility: CLINIC | Age: 58
End: 2023-01-08
Payer: COMMERCIAL

## 2023-01-08 VITALS
WEIGHT: 214 LBS | DIASTOLIC BLOOD PRESSURE: 70 MMHG | TEMPERATURE: 99 F | HEIGHT: 66 IN | RESPIRATION RATE: 16 BRPM | OXYGEN SATURATION: 98 % | HEART RATE: 71 BPM | SYSTOLIC BLOOD PRESSURE: 104 MMHG | BODY MASS INDEX: 34.39 KG/M2

## 2023-01-08 DIAGNOSIS — J01.40 ACUTE NON-RECURRENT PANSINUSITIS: Primary | ICD-10-CM

## 2023-01-08 PROCEDURE — 3008F BODY MASS INDEX DOCD: CPT | Performed by: NURSE PRACTITIONER

## 2023-01-08 PROCEDURE — 3078F DIAST BP <80 MM HG: CPT | Performed by: NURSE PRACTITIONER

## 2023-01-08 PROCEDURE — 3074F SYST BP LT 130 MM HG: CPT | Performed by: NURSE PRACTITIONER

## 2023-01-08 PROCEDURE — 99213 OFFICE O/P EST LOW 20 MIN: CPT | Performed by: NURSE PRACTITIONER

## 2023-01-08 RX ORDER — AMOXICILLIN AND CLAVULANATE POTASSIUM 875; 125 MG/1; MG/1
1 TABLET, FILM COATED ORAL 2 TIMES DAILY
Qty: 20 TABLET | Refills: 0 | Status: SHIPPED | OUTPATIENT
Start: 2023-01-08 | End: 2023-01-18

## 2023-01-08 RX ORDER — ATOGEPANT 60 MG/1
1 TABLET ORAL NIGHTLY
COMMUNITY
Start: 2022-11-29

## 2023-01-08 RX ORDER — UBROGEPANT 100 MG/1
TABLET ORAL AS NEEDED
COMMUNITY
Start: 2022-12-16

## 2023-01-08 RX ORDER — FLUTICASONE PROPIONATE 50 MCG
2 SPRAY, SUSPENSION (ML) NASAL DAILY
Qty: 1 EACH | Refills: 0 | Status: SHIPPED | OUTPATIENT
Start: 2023-01-08

## 2023-01-08 NOTE — PATIENT INSTRUCTIONS
Medication as prescribed  Follow up with your doctor or ENT if not improving this week.    Follow up sooner for any new or worsening symptoms

## 2023-01-26 ENCOUNTER — LAB ENCOUNTER (OUTPATIENT)
Dept: LAB | Age: 58
End: 2023-01-26
Attending: FAMILY MEDICINE
Payer: COMMERCIAL

## 2023-01-26 DIAGNOSIS — Z00.00 LABORATORY EXAM ORDERED AS PART OF ROUTINE GENERAL MEDICAL EXAMINATION: ICD-10-CM

## 2023-01-26 DIAGNOSIS — E23.7 DISEASE OF PITUITARY GLAND (HCC): Primary | ICD-10-CM

## 2023-01-26 DIAGNOSIS — E03.9 ACQUIRED HYPOTHYROIDISM: ICD-10-CM

## 2023-01-26 DIAGNOSIS — E55.9 VITAMIN D DEFICIENCY: ICD-10-CM

## 2023-01-26 LAB
ALBUMIN SERPL-MCNC: 3.8 G/DL (ref 3.4–5)
ALBUMIN/GLOB SERPL: 1.4 {RATIO} (ref 1–2)
ALP LIVER SERPL-CCNC: 71 U/L
ALT SERPL-CCNC: 18 U/L
ANION GAP SERPL CALC-SCNC: 2 MMOL/L (ref 0–18)
AST SERPL-CCNC: 18 U/L (ref 15–37)
BASOPHILS # BLD AUTO: 0.02 X10(3) UL (ref 0–0.2)
BASOPHILS NFR BLD AUTO: 0.4 %
BILIRUB SERPL-MCNC: 0.4 MG/DL (ref 0.1–2)
BUN BLD-MCNC: 12 MG/DL (ref 7–18)
CALCIUM BLD-MCNC: 9 MG/DL (ref 8.5–10.1)
CHLORIDE SERPL-SCNC: 109 MMOL/L (ref 98–112)
CHOLEST SERPL-MCNC: 213 MG/DL (ref ?–200)
CO2 SERPL-SCNC: 30 MMOL/L (ref 21–32)
CREAT BLD-MCNC: 0.81 MG/DL
EOSINOPHIL # BLD AUTO: 0.21 X10(3) UL (ref 0–0.7)
EOSINOPHIL NFR BLD AUTO: 4.1 %
ERYTHROCYTE [DISTWIDTH] IN BLOOD BY AUTOMATED COUNT: 12.7 %
FASTING PATIENT LIPID ANSWER: YES
FASTING STATUS PATIENT QL REPORTED: YES
FSH SERPL-ACNC: 63.7 MIU/ML
GFR SERPLBLD BASED ON 1.73 SQ M-ARVRAT: 86 ML/MIN/1.73M2 (ref 60–?)
GLOBULIN PLAS-MCNC: 2.8 G/DL (ref 2.8–4.4)
GLUCOSE BLD-MCNC: 102 MG/DL (ref 70–99)
HCT VFR BLD AUTO: 43.5 %
HDLC SERPL-MCNC: 56 MG/DL (ref 40–59)
HGB BLD-MCNC: 14.4 G/DL
IMM GRANULOCYTES # BLD AUTO: 0.01 X10(3) UL (ref 0–1)
IMM GRANULOCYTES NFR BLD: 0.2 %
LDLC SERPL CALC-MCNC: 137 MG/DL (ref ?–100)
LH SERPL-ACNC: 27.1 MIU/ML
LYMPHOCYTES # BLD AUTO: 1.2 X10(3) UL (ref 1–4)
LYMPHOCYTES NFR BLD AUTO: 23.2 %
MCH RBC QN AUTO: 30.1 PG (ref 26–34)
MCHC RBC AUTO-ENTMCNC: 33.1 G/DL (ref 31–37)
MCV RBC AUTO: 91 FL
MONOCYTES # BLD AUTO: 0.6 X10(3) UL (ref 0.1–1)
MONOCYTES NFR BLD AUTO: 11.6 %
NEUTROPHILS # BLD AUTO: 3.14 X10 (3) UL (ref 1.5–7.7)
NEUTROPHILS # BLD AUTO: 3.14 X10(3) UL (ref 1.5–7.7)
NEUTROPHILS NFR BLD AUTO: 60.5 %
NONHDLC SERPL-MCNC: 157 MG/DL (ref ?–130)
OSMOLALITY SERPL CALC.SUM OF ELEC: 292 MOSM/KG (ref 275–295)
PLATELET # BLD AUTO: 263 10(3)UL (ref 150–450)
POTASSIUM SERPL-SCNC: 4.1 MMOL/L (ref 3.5–5.1)
PROLACTIN SERPL-MCNC: 5.3 NG/ML
PROT SERPL-MCNC: 6.6 G/DL (ref 6.4–8.2)
RBC # BLD AUTO: 4.78 X10(6)UL
SODIUM SERPL-SCNC: 141 MMOL/L (ref 136–145)
T4 FREE SERPL-MCNC: 1.3 NG/DL (ref 0.8–1.7)
TRIGL SERPL-MCNC: 110 MG/DL (ref 30–149)
TSI SER-ACNC: 0.3 MIU/ML (ref 0.36–3.74)
VIT D+METAB SERPL-MCNC: 34.9 NG/ML (ref 30–100)
VLDLC SERPL CALC-MCNC: 20 MG/DL (ref 0–30)
WBC # BLD AUTO: 5.2 X10(3) UL (ref 4–11)

## 2023-01-26 PROCEDURE — 82306 VITAMIN D 25 HYDROXY: CPT | Performed by: FAMILY MEDICINE

## 2023-01-26 PROCEDURE — 80050 GENERAL HEALTH PANEL: CPT | Performed by: FAMILY MEDICINE

## 2023-01-26 PROCEDURE — 80061 LIPID PANEL: CPT | Performed by: FAMILY MEDICINE

## 2023-01-28 LAB — GROWTH HORMONE BASELINE: 0.19 NG/ML

## 2023-02-09 ENCOUNTER — HOSPITAL ENCOUNTER (OUTPATIENT)
Dept: MRI IMAGING | Age: 58
Discharge: HOME | End: 2023-02-09
Attending: FAMILY MEDICINE
Payer: COMMERCIAL

## 2023-02-09 DIAGNOSIS — E23.6 ABSCESS OF PITUITARY (HCC): ICD-10-CM

## 2023-02-09 PROCEDURE — 70553 MRI BRAIN STEM W/O & W/DYE: CPT | Performed by: FAMILY MEDICINE

## 2023-02-09 PROCEDURE — A9575 INJ GADOTERATE MEGLUMI 0.1ML: HCPCS

## 2023-02-09 RX ORDER — GADOTERATE MEGLUMINE 376.9 MG/ML
20 INJECTION INTRAVENOUS
Status: COMPLETED | OUTPATIENT
Start: 2023-02-09 | End: 2023-02-09

## 2023-02-09 RX ADMIN — GADOTERATE MEGLUMINE 20 ML: 376.9 INJECTION INTRAVENOUS at 11:10:00

## 2023-02-24 DIAGNOSIS — E03.9 ACQUIRED HYPOTHYROIDISM: Primary | ICD-10-CM

## 2023-02-24 RX ORDER — LEVOTHYROXINE SODIUM 112 UG/1
112 TABLET ORAL
Qty: 90 TABLET | Refills: 0 | Status: SHIPPED | OUTPATIENT
Start: 2023-02-24

## 2023-04-04 ENCOUNTER — LAB ENCOUNTER (OUTPATIENT)
Dept: LAB | Age: 58
End: 2023-04-04
Attending: INTERNAL MEDICINE
Payer: COMMERCIAL

## 2023-04-04 ENCOUNTER — OFFICE VISIT (OUTPATIENT)
Dept: INTERNAL MEDICINE CLINIC | Facility: CLINIC | Age: 58
End: 2023-04-04
Payer: COMMERCIAL

## 2023-04-04 VITALS
SYSTOLIC BLOOD PRESSURE: 120 MMHG | HEIGHT: 66 IN | OXYGEN SATURATION: 97 % | HEART RATE: 68 BPM | WEIGHT: 206 LBS | DIASTOLIC BLOOD PRESSURE: 88 MMHG | RESPIRATION RATE: 16 BRPM | BODY MASS INDEX: 33.11 KG/M2

## 2023-04-04 DIAGNOSIS — Z51.81 THERAPEUTIC DRUG MONITORING: ICD-10-CM

## 2023-04-04 DIAGNOSIS — Z51.81 THERAPEUTIC DRUG MONITORING: Primary | ICD-10-CM

## 2023-04-04 DIAGNOSIS — D05.11 DUCTAL CARCINOMA IN SITU (DCIS) OF RIGHT BREAST: ICD-10-CM

## 2023-04-04 DIAGNOSIS — E55.9 VITAMIN D DEFICIENCY: ICD-10-CM

## 2023-04-04 DIAGNOSIS — E66.09 CLASS 2 OBESITY DUE TO EXCESS CALORIES WITHOUT SERIOUS COMORBIDITY WITH BODY MASS INDEX (BMI) OF 35.0 TO 35.9 IN ADULT: ICD-10-CM

## 2023-04-04 DIAGNOSIS — R73.01 IFG (IMPAIRED FASTING GLUCOSE): ICD-10-CM

## 2023-04-04 DIAGNOSIS — E03.9 ACQUIRED HYPOTHYROIDISM: ICD-10-CM

## 2023-04-04 LAB
EST. AVERAGE GLUCOSE BLD GHB EST-MCNC: 111 MG/DL (ref 68–126)
FOLATE SERPL-MCNC: 28.8 NG/ML (ref 8.7–?)
HBA1C MFR BLD: 5.5 % (ref ?–5.7)
VIT B12 SERPL-MCNC: 409 PG/ML (ref 193–986)
VIT D+METAB SERPL-MCNC: 25 NG/ML (ref 30–100)

## 2023-04-04 PROCEDURE — 83036 HEMOGLOBIN GLYCOSYLATED A1C: CPT | Performed by: INTERNAL MEDICINE

## 2023-04-04 PROCEDURE — 82306 VITAMIN D 25 HYDROXY: CPT | Performed by: INTERNAL MEDICINE

## 2023-04-04 PROCEDURE — 3008F BODY MASS INDEX DOCD: CPT | Performed by: INTERNAL MEDICINE

## 2023-04-04 PROCEDURE — 3079F DIAST BP 80-89 MM HG: CPT | Performed by: INTERNAL MEDICINE

## 2023-04-04 PROCEDURE — 82746 ASSAY OF FOLIC ACID SERUM: CPT | Performed by: INTERNAL MEDICINE

## 2023-04-04 PROCEDURE — 3074F SYST BP LT 130 MM HG: CPT | Performed by: INTERNAL MEDICINE

## 2023-04-04 PROCEDURE — 99204 OFFICE O/P NEW MOD 45 MIN: CPT | Performed by: INTERNAL MEDICINE

## 2023-04-04 PROCEDURE — 82607 VITAMIN B-12: CPT | Performed by: INTERNAL MEDICINE

## 2023-04-04 NOTE — PATIENT INSTRUCTIONS
CALM magnesium     Wegovy   Ozempic   Mounjaro   Saxenda         Plan:  Continue with medications:   Go to the lab for blood work   Follow up with me in 1 month  Schedule follow up appointments: Erin Bueno (dietitian) or Gilma Barron (presurgery dietitian)   Check for insurance coverage for dietitian and labwork prior to scheduling appointment. Please try to work on the following dietary changes:  1. Drink lots of water and cut down on soda/juice consumption if soda/juice drinker  2. Eat breakfast daily (within 1 hour)  3. Focus on protein: (15-30 grams with each meal) ie. greek yogurt, cottage cheese, string cheese, hard boiled eggs   4. Healthy snacks: peanut butter and apples, hummus and carrots, yogurt and berries, nuts (1/4 cup), tuna and crackers    Premier protein shakes  Quest bars  Power crunch bars   Siggi yogurt (9% milk fat)   S[place]ento balanced breaks (cheese and nuts)- without chocolate   5. Reduce carbohydrates which includes sweets as well as rice, pasta, potatoes, bread, corn and instead choose whole grain options or more protein or vegetables. 6. Get a good night of sleep  7. Try to decrease stress in life- headspace / calm    Please download apps:  1. \"My Fitness Pal\" (other options are Lose it or Spark People) to help you to monitor daily dietary intake and you will be able to see if you are eating the right amount of calories, protein, and carbs. When you set the cameron up chose 1-2 lbs/week as a goal.  2. \"7 minute workout\" to help with exercise/activity which takes 7 minutes of your day and that you can do at home! 3. \"Calm\" which helps with mindfulness, meditation, clarity, sleep, and alejandra to your daily life. 4. [employer] blog for healthy recipe ideas  5. [employer]backs for low carb resources    HIGH PROTEIN SNACK IDEAS  -cottage cheese  -plain yogurt  -kefir  -hard-boiled eggs  -natural cheeses  -nuts (measure portion size)   -unsweetened nut butters  -dried edamame   -jennifer seeds soaked in water or almond milk  -soy nuts  -cured meats (monitor for sodium issues)   -hummus with vegetables  -bean dip with vegetables    FRUIT  Low carb fruit options   Raspberries: Half a cup (60 grams) contains 3 grams of carbs. Blackberries: Half a cup (70 grams) contains 4 grams of carbs. Strawberries: Half a cup (100 grams) contains 6 grams of carbs. Blueberries: Half a cup (50 grams) contains 6 grams of carbs. Plum: One medium-sized (80 grams) contains 6 grams of carbs.     VEGETABLES  Low carb vegetables

## 2023-04-26 ENCOUNTER — OFFICE VISIT (OUTPATIENT)
Dept: FAMILY MEDICINE CLINIC | Facility: CLINIC | Age: 58
End: 2023-04-26
Payer: COMMERCIAL

## 2023-04-26 VITALS
SYSTOLIC BLOOD PRESSURE: 120 MMHG | WEIGHT: 206 LBS | DIASTOLIC BLOOD PRESSURE: 70 MMHG | OXYGEN SATURATION: 98 % | BODY MASS INDEX: 33.11 KG/M2 | RESPIRATION RATE: 16 BRPM | HEIGHT: 66 IN | TEMPERATURE: 98 F | HEART RATE: 67 BPM

## 2023-04-26 DIAGNOSIS — Z12.31 ENCOUNTER FOR SCREENING MAMMOGRAM FOR MALIGNANT NEOPLASM OF BREAST: ICD-10-CM

## 2023-04-26 DIAGNOSIS — Z78.0 ASYMPTOMATIC MENOPAUSAL STATE: ICD-10-CM

## 2023-04-26 DIAGNOSIS — Z12.11 COLON CANCER SCREENING: ICD-10-CM

## 2023-04-26 DIAGNOSIS — Z79.899 MEDICATION MANAGEMENT: ICD-10-CM

## 2023-04-26 DIAGNOSIS — D35.2 PITUITARY MICROADENOMA (HCC): ICD-10-CM

## 2023-04-26 DIAGNOSIS — F41.9 ANXIETY: ICD-10-CM

## 2023-04-26 DIAGNOSIS — Z00.00 ROUTINE MEDICAL EXAM: Primary | ICD-10-CM

## 2023-04-26 PROCEDURE — 3008F BODY MASS INDEX DOCD: CPT | Performed by: FAMILY MEDICINE

## 2023-04-26 PROCEDURE — 99396 PREV VISIT EST AGE 40-64: CPT | Performed by: FAMILY MEDICINE

## 2023-04-26 PROCEDURE — 3078F DIAST BP <80 MM HG: CPT | Performed by: FAMILY MEDICINE

## 2023-04-26 PROCEDURE — 3074F SYST BP LT 130 MM HG: CPT | Performed by: FAMILY MEDICINE

## 2023-04-26 RX ORDER — ALPRAZOLAM 0.25 MG/1
0.25 TABLET ORAL NIGHTLY PRN
Qty: 30 TABLET | Refills: 0 | Status: SHIPPED | OUTPATIENT
Start: 2023-04-26

## 2023-05-06 ENCOUNTER — HOSPITAL ENCOUNTER (OUTPATIENT)
Dept: BONE DENSITY | Age: 58
Discharge: HOME | End: 2023-05-06
Attending: FAMILY MEDICINE
Payer: COMMERCIAL

## 2023-05-06 DIAGNOSIS — Z78.0 ASYMPTOMATIC MENOPAUSAL STATE: ICD-10-CM

## 2023-05-06 DIAGNOSIS — Z79.899 MEDICATION MANAGEMENT: ICD-10-CM

## 2023-05-06 PROCEDURE — 77080 DXA BONE DENSITY AXIAL: CPT | Performed by: FAMILY MEDICINE

## 2023-05-09 ENCOUNTER — TELEPHONE (OUTPATIENT)
Dept: FAMILY MEDICINE CLINIC | Facility: CLINIC | Age: 58
End: 2023-05-09

## 2023-05-09 NOTE — TELEPHONE ENCOUNTER
Pt dropped off a wellness form that has to be completed. She accidentally gave her husbands form the 1st time and her employer needs the form with her name and info. I told her we would call her when it's ready. The form was placed in the MA folder with the flowsheet and fee form. She would like it faxed to [phone] or [phone]. She placed her husbands form on the back of the forms just so Dr. Yoav Mack can see she gave the incorrect one to Dr. Yoav Mack.

## 2023-05-10 DIAGNOSIS — E03.9 ACQUIRED HYPOTHYROIDISM: ICD-10-CM

## 2023-05-11 RX ORDER — LEVOTHYROXINE SODIUM 112 UG/1
112 TABLET ORAL
Qty: 30 TABLET | Refills: 0 | Status: SHIPPED | OUTPATIENT
Start: 2023-05-11

## 2023-05-11 NOTE — TELEPHONE ENCOUNTER
Pt is calling to follow up on form completion. Pt would like completed by the 15th. Please fax to number on form.

## 2023-05-13 DIAGNOSIS — E03.9 ACQUIRED HYPOTHYROIDISM: ICD-10-CM

## 2023-05-13 RX ORDER — LEVOTHYROXINE SODIUM 112 UG/1
TABLET ORAL
Qty: 90 TABLET | Refills: 0 | OUTPATIENT
Start: 2023-05-13

## 2023-05-26 NOTE — TELEPHONE ENCOUNTER
Dr. Tatianna Arthur approved the Rx with me on the phone. Wegovy 1 mg sent [facility] per patient request (see other my chart)  I called patient to let her know.

## 2023-05-26 NOTE — TELEPHONE ENCOUNTER
Requesting Wegovy increase  LOV: 4/4/23   RTC: one month  Last Relevant Labs: na  Filled: 4/4/23 #2ml with 0 refills wegovy 0.5 mg    Future Appointments   Date Time Provider Farzana Butler   6/15/2023 12:40 PM Giuliana Easley MD [address]   7/3/2023 12:40 PM Giuliana Easley MD [facility]   8/7/2023 12:00 PM Giuliana Easley MD [facility]     I called patient to let her know there have been issues with supply and advised she call her pharmacy to see if they have 1 mg in stock and if not to check other stores and let us know.   She took her last shot Wednesday

## 2023-06-03 ENCOUNTER — LAB ENCOUNTER (OUTPATIENT)
Dept: LAB | Age: 58
End: 2023-06-03
Attending: FAMILY MEDICINE
Payer: COMMERCIAL

## 2023-06-03 DIAGNOSIS — E03.9 ACQUIRED HYPOTHYROIDISM: ICD-10-CM

## 2023-06-03 LAB — TSI SER-ACNC: 0.88 MIU/ML (ref 0.36–3.74)

## 2023-06-03 PROCEDURE — 84443 ASSAY THYROID STIM HORMONE: CPT | Performed by: FAMILY MEDICINE

## 2023-06-11 RX ORDER — LEVOTHYROXINE SODIUM 112 UG/1
1 CAPSULE ORAL EVERY MORNING
Qty: 90 CAPSULE | Refills: 3 | Status: SHIPPED | OUTPATIENT
Start: 2023-06-11 | End: 2023-07-11

## 2023-06-13 RX ORDER — LEVOTHYROXINE SODIUM 112 UG/1
1 CAPSULE ORAL EVERY MORNING
Qty: 90 CAPSULE | Refills: 3 | Status: CANCELLED | OUTPATIENT
Start: 2023-06-13 | End: 2023-07-13

## 2023-06-13 NOTE — TELEPHONE ENCOUNTER
vothyroxine Sodium 112 MCG Oral Cap           Possible duplicate: Jaystephanie to review recent actions on this medication    Sig: Take 1 capsule by mouth every morning. Disp: 90 capsule    Refills: 3    Start: 6/13/2023 - 7/13/2023    Class: Normal    Non-formulary    Last ordered: 2 days ago by Rory Leon MD     Thyroid Supplements Protocol [id number]/13/2023 06:51 AM   Protocol Details TSH test in past 12 months    TSH value between 0.350 and 5.500 IU/ml    Appointment in past 12 or next 3 months      To be filled at: [facility], [phone], [address] [place] [address] [address]    Levothyroxine Sodium 112 MCG Oral Cap 90 capsule 3 6/11/2023 7/11/2023    Sig - Route:  Take 1 capsule by mouth every morning. - Oral    Sent to pharmacy as: Levothyroxine Sodium 112 MCG Oral Capsule    E-Prescribing Status: Receipt confirmed by pharmacy (6/11/2023  9:49 AM CDT)        Duplicate

## 2023-06-15 ENCOUNTER — OFFICE VISIT (OUTPATIENT)
Dept: INTERNAL MEDICINE CLINIC | Facility: CLINIC | Age: 58
End: 2023-06-15
Payer: COMMERCIAL

## 2023-06-15 VITALS
HEIGHT: 66 IN | WEIGHT: 195 LBS | RESPIRATION RATE: 16 BRPM | OXYGEN SATURATION: 96 % | SYSTOLIC BLOOD PRESSURE: 116 MMHG | BODY MASS INDEX: 31.34 KG/M2 | DIASTOLIC BLOOD PRESSURE: 78 MMHG | HEART RATE: 80 BPM

## 2023-06-15 DIAGNOSIS — Z51.81 THERAPEUTIC DRUG MONITORING: Primary | ICD-10-CM

## 2023-06-15 DIAGNOSIS — E66.09 CLASS 2 OBESITY DUE TO EXCESS CALORIES WITHOUT SERIOUS COMORBIDITY WITH BODY MASS INDEX (BMI) OF 35.0 TO 35.9 IN ADULT: ICD-10-CM

## 2023-06-15 PROCEDURE — 3074F SYST BP LT 130 MM HG: CPT | Performed by: INTERNAL MEDICINE

## 2023-06-15 PROCEDURE — 3078F DIAST BP <80 MM HG: CPT | Performed by: INTERNAL MEDICINE

## 2023-06-15 PROCEDURE — 99213 OFFICE O/P EST LOW 20 MIN: CPT | Performed by: INTERNAL MEDICINE

## 2023-06-15 PROCEDURE — 3008F BODY MASS INDEX DOCD: CPT | Performed by: INTERNAL MEDICINE

## 2023-06-15 RX ORDER — METHYLPHENIDATE HYDROCHLORIDE 30 MG/1
30 CAPSULE, EXTENDED RELEASE ORAL DAILY
COMMUNITY
Start: 2023-06-02

## 2023-07-18 DIAGNOSIS — E03.9 ACQUIRED HYPOTHYROIDISM: ICD-10-CM

## 2023-07-18 RX ORDER — LEVOTHYROXINE SODIUM 112 UG/1
112 TABLET ORAL
Qty: 90 TABLET | Refills: 3 | Status: SHIPPED | OUTPATIENT
Start: 2023-07-18

## 2023-07-18 NOTE — TELEPHONE ENCOUNTER
vothyroxine 112 MCG Oral Tab          Sig: Take 1 tablet (112 mcg total) by mouth before breakfast. Needs labs for further refills. Disp: 30 tablet    Refills: 0    Start: 7/18/2023    Class: Normal    Non-formulary For: Acquired hypothyroidism    Last ordered: 2 months ago by Garry Troncoso MD     Thyroid Supplements Protocol [id number]/18/2023 09:09 AM   Protocol Details TSH test in past 12 months    TSH value between 0.350 and 5.500 IU/ml    Appointment in past 12 or next 3 months      To be filled at: [address], [address] [place] [address] AT [facility], [phone], [phone]     . LOV: 4/26/23 for physical  RTC: 1 year  Last Relevant Labs: 6/3/23  Filled: 5/11/23 #30 with 0 refills    Future Appointments   Date Time Provider [facility]   9/25/2023 12:20 PM Prateek Knight MD [facility]     Refill request approved per protocol.

## 2023-07-21 ENCOUNTER — TELEPHONE (OUTPATIENT)
Dept: SURGERY | Facility: CLINIC | Age: 58
End: 2023-07-21

## 2023-07-21 NOTE — TELEPHONE ENCOUNTER
Rcvd referral for pt from  for Pituitary macroadenoma, verified with nurses and was told pt should see neurosx and not neurology ; Reached out to pt to schedule an appointment however pt declined states if needs appointment wcb

## 2023-08-26 NOTE — TELEPHONE ENCOUNTER
Form faxed today and message left for patient that labs are not yet reviewed but we will call when available. Further asked patient to let us know if she needs copy. Saved in green triage folder. Patient is a 85y old  Female  from home ambulates independently with pmhx of CHF with EF 30% , HLD, PE on Eliquis, Breast CA, congenital Lt atrophic kidney, pshx cholecystectomy, bilateral mastectomy and ?cholecystectomy presented with left knee pain. Patient states pain started today mainly posterior to knee cap associated with swelling of the knee making her unable to ambulate, thus she stayed in bed the whole day. Pain is 8/10, elicited with movement of knee. She denies any history of gout or similar symptoms. No fever or, chill. On admission, she has no fever and no Leukocytosis. The ID consult requested to assist with further evaluation of Left knee swelling and r/o septic Arthritis.     # Left Knee swelling and pain - R/O Septic  arthritis    would recommend:    1. Ortho evaluation for Left knee arthrocentesis   2. Hold OFF Abx for now  3. Keep Left knee elevated and Pain management as needed    d/w House staff, Dr. Ho    will follow the patient with you and make further recommendation based on the clinical course and Lab results  Thank you for the opportunity to participate in Ms. GARCIA's care    Attending Attestation:    Spent more than 65 minutes on total encounter, more than 50 % of the visit was spent counseling and/or coordinating care by the Attending physician.

## 2023-09-20 NOTE — TELEPHONE ENCOUNTER
Requesting   Requested Prescriptions     Pending Prescriptions Disp Refills    WEGOVY 1.7 MG/0.75ML Subcutaneous Solution Auto-injector [Pharmacy Med Name: Marleen Griffithn 1.7MG/0.75ML PF PEN INJ] 3 mL 2     Sig: INJECT 0.75 ML(1.7MG) INTO THE SKIN ONCE A WEEK     LOV: 6/15/23  RTC: not noted  Filled: 6/15/23 #3 with 2 refills    Future Appointments   Date Time Provider Farzana Butler   9/25/2023 12:20 PM Charly Darling MD [facility]

## 2023-09-21 RX ORDER — SEMAGLUTIDE 1.7 MG/.75ML
0.75 INJECTION, SOLUTION SUBCUTANEOUS WEEKLY
Qty: 3 ML | Refills: 2 | Status: SHIPPED | OUTPATIENT
Start: 2023-09-21

## 2023-09-25 ENCOUNTER — TELEMEDICINE (OUTPATIENT)
Dept: INTERNAL MEDICINE CLINIC | Facility: CLINIC | Age: 58
End: 2023-09-25
Payer: COMMERCIAL

## 2023-09-25 DIAGNOSIS — Z51.81 THERAPEUTIC DRUG MONITORING: Primary | ICD-10-CM

## 2023-09-25 DIAGNOSIS — E66.09 CLASS 2 OBESITY DUE TO EXCESS CALORIES WITHOUT SERIOUS COMORBIDITY WITH BODY MASS INDEX (BMI) OF 35.0 TO 35.9 IN ADULT: ICD-10-CM

## 2023-09-25 NOTE — PROGRESS NOTES
HISTORY OF PRESENT ILLNESS  Patient presents with:  Weight Check: Video        Preet Cochran is a 66year old female here for follow up in medical weight loss program.     Denies chest pain, shortness of breath, dizziness, blurred vision, headache, paresthesia, nausea/vomiting. Down to 177 lb on home scale  No side effects with medication   Able to tolerate medication and adjusts quickly   Feels that appetite suppression is really good. Exercise: walks as much as she can    is undergoing treatment.    Tries to do mild physical activity/squats/ push ups and walking       Wt Readings from Last 6 Encounters:  06/15/23 : 195 lb (88.5 kg)  04/26/23 : 206 lb (93.4 kg)  04/04/23 : 206 lb (93.4 kg)  01/08/23 : 214 lb (97.1 kg)  08/19/22 : 218 lb (98.9 kg)  05/20/22 : 216 lb (98 kg)           Breakfast Lunch Dinner Snacks Fluids   REviewed             REVIEW OF SYSTEMS  GENERAL HEALTH: feels well otherwise, denied any fevers chills or night sweats   RESPIRATORY: denies shortness of breath   CARDIOVASCULAR: denies chest pain  GI: denies abdominal pain    EXAM  LMP 01/11/2022 (Approximate)   EXAM  Reviewed most recent set of vitals   Physical Exam:  alert, appears stated age and cooperative, Normocephalic, without obvious abnormality, atraumatic, lips, mucosa, and tongue normal; teeth and gums normal, Speaking in full sentences comfortably, Normal work of breathing, Skin color, texture, turgor normal. No rashes or lesions and age appropriate, normal, logical connections and person, place and time/date      Lab Results   Component Value Date    WBC 5.2 01/26/2023    RBC 4.78 01/26/2023    HGB 14.4 01/26/2023    HCT 43.5 01/26/2023    MCV 91.0 01/26/2023    MCH 30.1 01/26/2023    MCHC 33.1 01/26/2023    RDW 12.7 01/26/2023    .0 01/26/2023     Lab Results   Component Value Date     (H) 01/26/2023    BUN 12 01/26/2023    BUNCREA 17.6 04/19/2019    CREATSERUM 0.81 01/26/2023    ANIONGAP 2 01/26/2023 GFR 75 02/25/2017    GFRNAA 80 04/25/2022    GFRAA 92 04/25/2022    CA 9.0 01/26/2023    OSMOCALC 292 01/26/2023    ALKPHO 71 01/26/2023    AST 18 01/26/2023    ALT 18 01/26/2023    BILT 0.4 01/26/2023    TP 6.6 01/26/2023    ALB 3.8 01/26/2023    GLOBULIN 2.8 01/26/2023     01/26/2023    K 4.1 01/26/2023     01/26/2023    CO2 30.0 01/26/2023     Lab Results   Component Value Date     04/04/2023    A1C 5.5 04/04/2023     Lab Results   Component Value Date    CHOLEST 213 (H) 01/26/2023    TRIG 110 01/26/2023    HDL 56 01/26/2023     (H) 01/26/2023    VLDL 20 01/26/2023    TCHDLRATIO 3.04 04/16/2018    NONHDLC 157 (H) 01/26/2023     Lab Results   Component Value Date    T4F 1.3 01/26/2023    TSH 0.882 06/03/2023     Lab Results   Component Value Date    B12 409 04/04/2023     Lab Results   Component Value Date    VITD 25.0 (L) 04/04/2023       semaglutide-weight management (WEGOVY) 1.7 MG/0.75ML Subcutaneous Solution Auto-injector, Inject 0.75 mL (1.7 mg total) into the skin once a week., Disp: 3 mL, Rfl: 2  levothyroxine 112 MCG Oral Tab, Take 1 tablet (112 mcg total) by mouth before breakfast., Disp: 90 tablet, Rfl: 3  Methylphenidate HCl ER, CD, 30 MG Oral Cap CR, Take 1 capsule (30 mg total) by mouth daily. , Disp: , Rfl:   ALPRAZolam 0.25 MG Oral Tab, Take 1 tablet (0.25 mg total) by mouth nightly as needed. , Disp: 30 tablet, Rfl: 0  ergocalciferol 1.25 MG (29402 UT) Oral Cap, Take 1 capsule (50,000 Units total) by mouth once a week. With food for 12 weeks total then begin OTC Vitamin D 2000 units daily with food thereafter, Disp: 12 capsule, Rfl: 0  QULIPTA 60 MG Oral Tab, Take 1 tablet by mouth nightly. TAKE AT BEDTIME, Disp: , Rfl:   UBRELVY 100 MG Oral Tab, as needed. , Disp: , Rfl:   fluticasone propionate 50 MCG/ACT Nasal Suspension, 2 sprays by Each Nare route daily. , Disp: 1 each, Rfl: 0  Rizatriptan Benzoate 10 MG Oral Tab, Take 1 tablet (10 mg total) by mouth as needed for Migraine. MAY REPEAT IN 2 HOUR FOR 2. MAX 30 MG/ 24 HOUR, Disp: 10 tablet, Rfl: 1  buPROPion  MG Oral Tablet 24 Hr, Take 1 tablet (150 mg total) by mouth daily. , Disp: 90 tablet, Rfl: 1  methylphenidate ER 36 MG Oral Tab CR, Take 1 tablet (36 mg total) by mouth daily. , Disp: , Rfl:   HYDROXYCHLOROQUINE 200 MG Oral Tab, TAKE 1 TABLET(200 MG) BY MOUTH TWICE DAILY, Disp: 180 tablet, Rfl: 0  Albuterol Sulfate 108 (90 Base) MCG/ACT Inhalation Aerosol Powder, Breath Activated, Inhale 2 puffs into the lungs every 4 (four) hours as needed (sob). , Disp: 1 each, Rfl: 1  Cholecalciferol (VITAMIN D) 1000 UNITS Oral Tab, Take 1,000 Units by mouth daily. , Disp: , Rfl:   ibuprofen 200 MG Oral Tab, Take 2 tablets (400 mg total) by mouth 2 (two) times daily. , Disp: , Rfl:     No current facility-administered medications on file prior to visit. ASSESSMENT  Analyzed weight data:       Diagnoses and all orders for this visit:    Therapeutic drug monitoring    Class 2 obesity due to excess calories without serious comorbidity with body mass index (BMI) of 35.0 to 35.9 in adult          PLAN  Initital consult 4/23 at 206 lb   Down to 177 lb on home scale   Doing excellent with weight loss   Continue to encourage strength training  Continue wegovy   -advised of side effects and adverse effects of this medication  Nutrition: low carb diet/ recommended to eat breakfast daily/ regular protein intake  Medication use and side effects reviewed with patient. Medication contraindications: n/a  Follow up with dietitian and psychologist as recommended. Discussed the role of sleep and stress in weight management. Counseled on comprehensive weight loss plan including attention to nutrition, exercise and behavior/stress management for success. See patient instruction below for more details.   Discussed strategies to overcome barriers to successful weight loss and weight maintenance  FITTE: ACSM recommendations (150-300 minutes/ week in active weight loss)   Weight Loss consent to treat reviewed and signed   There are no Patient Instructions on file for this visit. No follow-ups on file. Patient verbalizes understanding.     Ethel Majano MD

## 2023-10-01 DIAGNOSIS — F41.9 ANXIETY: ICD-10-CM

## 2023-10-01 DIAGNOSIS — E66.09 CLASS 2 OBESITY DUE TO EXCESS CALORIES WITHOUT SERIOUS COMORBIDITY WITH BODY MASS INDEX (BMI) OF 35.0 TO 35.9 IN ADULT: ICD-10-CM

## 2023-10-02 RX ORDER — BUPROPION HYDROCHLORIDE 150 MG/1
150 TABLET ORAL DAILY
Qty: 90 TABLET | Refills: 1 | Status: SHIPPED | OUTPATIENT
Start: 2023-10-02

## 2023-10-02 NOTE — TELEPHONE ENCOUNTER
Requesting Bupropion 150mg  LOV: 4/26/23 Physical  RTC: 1 year  Last Relevant Labs: 1/26/23  Filled: 8/19/22 #90 with 1 refills    Future Appointments   Date Time Provider Farzana Butler   1/19/2024 12:40 PM Jose Murguia PA-C [facility]     Non-protocol med:  Rx pended and routed for approval/denial

## 2023-10-19 RX ORDER — SEMAGLUTIDE 1.7 MG/.75ML
0.75 INJECTION, SOLUTION SUBCUTANEOUS WEEKLY
Qty: 3 ML | Refills: 2 | OUTPATIENT
Start: 2023-10-19

## 2023-10-19 NOTE — TELEPHONE ENCOUNTER
Requesting   Requested Prescriptions     Pending Prescriptions Disp Refills    semaglutide-weight management (WEGOVY) 1.7 MG/0.75ML Subcutaneous Solution Auto-injector 3 mL 2     Sig: Inject 0.75 mL (1.7 mg total) into the skin once a week.      LOV: 9/25/23  RTC: not noted  Filled: 9/21/23 #3 with 2 refills    Future Appointments   Date Time Provider Farzana Butler   1/19/2024 12:40 PM Lana Camp PA-C [facility]     Refill too soon

## 2023-11-22 DIAGNOSIS — F41.9 ANXIETY: ICD-10-CM

## 2023-11-28 NOTE — TELEPHONE ENCOUNTER
Requesting Alprazolam 0.25mg  LOV: 4/26/23 Physical  RTC: 1 year  Last Relevant Labs: 1/26/23  Filled: 4/26/23 #30 with 0 refills    Future Appointments   Date Time Provider Farzana Butler   1/19/2024 12:40 PM Olga Lidia Dempsey PA-C [facility]     Rx pended and routed for approval/denial

## 2023-11-29 RX ORDER — ALPRAZOLAM 0.25 MG/1
0.25 TABLET ORAL NIGHTLY PRN
Qty: 30 TABLET | Refills: 0 | Status: SHIPPED | OUTPATIENT
Start: 2023-11-29

## 2023-12-14 RX ORDER — SEMAGLUTIDE 1.7 MG/.75ML
1.7 INJECTION, SOLUTION SUBCUTANEOUS WEEKLY
Qty: 3 ML | Refills: 2 | Status: SHIPPED | OUTPATIENT
Start: 2023-12-14

## 2023-12-14 NOTE — TELEPHONE ENCOUNTER
Requesting   Requested Prescriptions     Pending Prescriptions Disp Refills    WEGOVY 1.7 MG/0.75ML Subcutaneous Solution Auto-injector [Pharmacy Med Name: Orvel Haw 1.7MG/0.75ML PF PEN INJ] 3 mL 2     Sig: INJECT 1.7 MG UNDER THE SKIN ONCE A WEEK     LOV: 9/25/23  RTC: not noted  Filled: 9/21/23 #3 with 2 refills    Future Appointments   Date Time Provider Farzana Butler   1/19/2024 12:40 PM MELVI Capps [facility]

## 2024-01-11 ENCOUNTER — PATIENT MESSAGE (OUTPATIENT)
Dept: INTERNAL MEDICINE CLINIC | Facility: CLINIC | Age: 59
End: 2024-01-11

## 2024-01-12 NOTE — TELEPHONE ENCOUNTER
From: Jesusita Jorgensen  To: Betsy Pelaez  Sent: 1/11/2024 6:12 PM CST  Subject: Wegovy not being filled    Hi Dr. Pelaez, [facility] can’t refill my wegovy and the message says it’s not covered by my insurance. I hope this is a mistake. I’m doing so well on it. I’m down to 164 and I feel so much healthier. Would they just drop someone off a medication like that? I hoe not.

## 2024-01-19 ENCOUNTER — OFFICE VISIT (OUTPATIENT)
Dept: INTERNAL MEDICINE CLINIC | Facility: CLINIC | Age: 59
End: 2024-01-19
Payer: COMMERCIAL

## 2024-01-19 VITALS
HEIGHT: 66 IN | HEART RATE: 84 BPM | DIASTOLIC BLOOD PRESSURE: 72 MMHG | RESPIRATION RATE: 16 BRPM | WEIGHT: 165 LBS | SYSTOLIC BLOOD PRESSURE: 110 MMHG | BODY MASS INDEX: 26.52 KG/M2

## 2024-01-19 DIAGNOSIS — R73.01 IFG (IMPAIRED FASTING GLUCOSE): ICD-10-CM

## 2024-01-19 DIAGNOSIS — E78.5 HYPERLIPIDEMIA, UNSPECIFIED HYPERLIPIDEMIA TYPE: ICD-10-CM

## 2024-01-19 DIAGNOSIS — E55.9 VITAMIN D DEFICIENCY: ICD-10-CM

## 2024-01-19 DIAGNOSIS — E66.3 OVERWEIGHT (BMI 25.0-29.9): ICD-10-CM

## 2024-01-19 DIAGNOSIS — F41.9 ANXIETY: ICD-10-CM

## 2024-01-19 DIAGNOSIS — Z51.81 THERAPEUTIC DRUG MONITORING: Primary | ICD-10-CM

## 2024-01-19 RX ORDER — RIMEGEPANT SULFATE 75 MG/75MG
TABLET, ORALLY DISINTEGRATING ORAL
COMMUNITY
Start: 2023-12-01

## 2024-01-19 RX ORDER — TIRZEPATIDE 5 MG/.5ML
5 INJECTION, SOLUTION SUBCUTANEOUS WEEKLY
Qty: 2 ML | Refills: 0 | Status: SHIPPED | OUTPATIENT
Start: 2024-01-19

## 2024-01-19 RX ORDER — METHYLPHENIDATE HYDROCHLORIDE 40 MG/1
40 CAPSULE, EXTENDED RELEASE ORAL DAILY
COMMUNITY
Start: 2024-01-06

## 2024-01-19 NOTE — PROGRESS NOTES
HISTORY OF PRESENT ILLNESS  Chief Complaint   Patient presents with    Weight Check     -30       Jesusita Jorgensen is a 58 year old female here for follow up in medical weight loss program.   -30lbs  Has been Wegovy for about 1.5 weeks, insurance will no longer cover it  Denies chest pain, shortness of breath, dizziness, blurred vision, headache, paresthesia, nausea/vomiting.   Weakness is sweets  Protein, veggies    Exercise/Activity: walking, exercise bike, strength training  Nutrition: 24 hour food log reviewed, eating regular meals, +protein  Stress: 8/10  Sleep: 6 hours/night       Wt Readings from Last 6 Encounters:   01/19/24 165 lb (74.8 kg)   06/15/23 195 lb (88.5 kg)   04/26/23 206 lb (93.4 kg)   04/04/23 206 lb (93.4 kg)   01/08/23 214 lb (97.1 kg)   08/19/22 218 lb (98.9 kg)            Breakfast Lunch Dinner Snacks Fluids   Reviewed           REVIEW OF SYSTEMS  GENERAL HEALTH: feels well otherwise, denied any fevers chills or night sweats   RESPIRATORY: denies shortness of breath   CARDIOVASCULAR: denies chest pain  GI: denies abdominal pain    EXAM  /72   Pulse 84   Resp 16   Ht 5' 6\" (1.676 m)   Wt 165 lb (74.8 kg)   LMP 01/11/2022 (Approximate)   BMI 26.63 kg/m²   GENERAL: well developed, well nourished,in no apparent distress, A/O x3  SKIN: no rashes,no suspicious lesions  HEENT: atraumatic, normocephalic, OP-clear, PERRL  NECK: supple,no adenopathy  LUNGS: clear to auscultation bilaterally   CARDIO: RRR without murmur  EXTREMITIES: no cyanosis, no clubbing, no edema    Lab Results   Component Value Date    WBC 5.2 01/26/2023    RBC 4.78 01/26/2023    HGB 14.4 01/26/2023    HCT 43.5 01/26/2023    MCV 91.0 01/26/2023    MCH 30.1 01/26/2023    MCHC 33.1 01/26/2023    RDW 12.7 01/26/2023    .0 01/26/2023     Lab Results   Component Value Date     (H) 01/26/2023    BUN 12 01/26/2023    BUNCREA 17.6 04/19/2019    CREATSERUM 0.81 01/26/2023    ANIONGAP 2 01/26/2023    GFR 75  02/25/2017    GFRNAA 80 04/25/2022    GFRAA 92 04/25/2022    CA 9.0 01/26/2023    OSMOCALC 292 01/26/2023    ALKPHO 71 01/26/2023    AST 18 01/26/2023    ALT 18 01/26/2023    BILT 0.4 01/26/2023    TP 6.6 01/26/2023    ALB 3.8 01/26/2023    GLOBULIN 2.8 01/26/2023     01/26/2023    K 4.1 01/26/2023     01/26/2023    CO2 30.0 01/26/2023     Lab Results   Component Value Date     04/04/2023    A1C 5.5 04/04/2023     Lab Results   Component Value Date    CHOLEST 213 (H) 01/26/2023    TRIG 110 01/26/2023    HDL 56 01/26/2023     (H) 01/26/2023    VLDL 20 01/26/2023    TCHDLRATIO 3.04 04/16/2018    NONHDLC 157 (H) 01/26/2023     Lab Results   Component Value Date    T4F 1.3 01/26/2023    TSH 0.882 06/03/2023     Lab Results   Component Value Date    B12 409 04/04/2023     Lab Results   Component Value Date    VITD 25.0 (L) 04/04/2023       Current Outpatient Medications on File Prior to Visit   Medication Sig Dispense Refill    NURTEC 75 MG Oral Tablet Dispersible PLACE 1 TABLET (75 MG) ON TOP OF TONGUE, ALLOW TO DISSOLVE THEN SWALLOW BY TRANSLINGUAL ROUTE EVERY OTHER DAY      Methylphenidate HCl ER, CD, 40 MG Oral Cap CR Take 1 capsule (40 mg total) by mouth daily.      ALPRAZolam 0.25 MG Oral Tab Take 1 tablet (0.25 mg total) by mouth nightly as needed. 30 tablet 0    buPROPion  MG Oral Tablet 24 Hr Take 1 tablet (150 mg total) by mouth daily. 90 tablet 1    levothyroxine 112 MCG Oral Tab Take 1 tablet (112 mcg total) by mouth before breakfast. 90 tablet 3    Methylphenidate HCl ER, CD, 30 MG Oral Cap CR Take 1 capsule (30 mg total) by mouth daily.      ergocalciferol 1.25 MG (57119 UT) Oral Cap Take 1 capsule (50,000 Units total) by mouth once a week. With food for 12 weeks total then begin OTC Vitamin D 2000 units daily with food thereafter 12 capsule 0    QULIPTA 60 MG Oral Tab Take 1 tablet by mouth nightly. TAKE AT BEDTIME      UBRELVY 100 MG Oral Tab as needed.      fluticasone  propionate 50 MCG/ACT Nasal Suspension 2 sprays by Each Nare route daily. 1 each 0    Rizatriptan Benzoate 10 MG Oral Tab Take 1 tablet (10 mg total) by mouth as needed for Migraine. MAY REPEAT IN 2 HOUR FOR 2. MAX 30 MG/ 24 HOUR 10 tablet 1    methylphenidate ER 36 MG Oral Tab CR Take 1 tablet (36 mg total) by mouth daily.      HYDROXYCHLOROQUINE 200 MG Oral Tab TAKE 1 TABLET(200 MG) BY MOUTH TWICE DAILY 180 tablet 0    Albuterol Sulfate 108 (90 Base) MCG/ACT Inhalation Aerosol Powder, Breath Activated Inhale 2 puffs into the lungs every 4 (four) hours as needed (sob). 1 each 1    Cholecalciferol (VITAMIN D) 1000 UNITS Oral Tab Take 1,000 Units by mouth daily.      ibuprofen 200 MG Oral Tab Take 2 tablets (400 mg total) by mouth 2 (two) times daily.      semaglutide-weight management (WEGOVY) 1.7 MG/0.75ML Subcutaneous Solution Auto-injector Inject 0.75 mL (1.7 mg total) into the skin once a week. (Patient not taking: Reported on 1/19/2024) 3 mL 2     No current facility-administered medications on file prior to visit.       ASSESSMENT  Analyzed weight data:       Diagnoses and all orders for this visit:    Therapeutic drug monitoring    Overweight (BMI 25.0-29.9)    IFG (impaired fasting glucose)    Hyperlipidemia, unspecified hyperlipidemia type    Vitamin D deficiency    Anxiety    Other orders  -     Tirzepatide-Weight Management (ZEPBOUND) 5 MG/0.5ML Subcutaneous Solution Auto-injector; Inject 5 mg into the skin once a week.        PLAN  Initial Weight: 207lbs  Initial Weight Date: 4/4/23  Today's Weight: 165lbs  5% Goal: 10.35lbs  10% Goal: 20.7lbs  Total Weight Loss:     Will begin Zepbound 5mg weekly - denies any personal or family history of pancreatitis, pancreatic cancer, thyroid cancer, MEN2 - discussed MOA, advised side effects and adverse effects of medication.  Discussed if unable to get coverage for Zepbound or Wegovy we could try compound semaglutide  IFG - low carb diet  HLD - lifestyle  changes  Mood is stable on current medications, continue to work on stress management  Consistency with logging foods - protein and produce  Nutrition: low carb diet/ recommended to eat breakfast daily/ regular protein intake  Medication use and side effects reviewed with patient.  Medication contraindications:   Follow up with dietitian and psychologist as recommended.  Discussed the role of sleep and stress in weight management.  Counseled on comprehensive weight loss plan including attention to nutrition, exercise and behavior/stress management for success. See patient instruction below for more details.  Discussed strategies to overcome barriers to successful weight loss and weight maintenance  FITTE: ACSM recommendations (150-300 minutes/ week in active weight loss)   Total time spent on chart review, pre-charting, obtaining history, counseling, and educating, reviewing labs was 30 minutes.  Weight Loss consent to treat reviewed and signed       NOTE TO PATIENT: The 21st Century Cures Act makes clinical notes like these available to patients in the interest of transparency. Clinical notes are medical documents used by physicians and care providers to communicate with each other. These documents include medical language and terminology, abbreviations, and treatment information that may sound technical and at times possibly unfamiliar. In addition, at times, the verbiage may appear blunt or direct. These documents are one tool providers use to communicate relevant information and clinical opinions of the care providers in a way that allows common understanding of the clinical context.     There are no Patient Instructions on file for this visit.    No follow-ups on file.    Patient verbalizes understanding.    Yessica Menjivar PA-C  1/19/2024

## 2024-01-31 ENCOUNTER — PATIENT MESSAGE (OUTPATIENT)
Dept: INTERNAL MEDICINE CLINIC | Facility: CLINIC | Age: 59
End: 2024-01-31

## 2024-01-31 NOTE — TELEPHONE ENCOUNTER
From: Jesusita Jorgensen  To: Betsy Pelaez  Sent: 1/31/2024 8:48 AM CST  Subject: No word on wegovy or zepbound    Hi Dr. Pelaez and Sabrina,   I just realized I don’t have an appt scheduled and I haven’t heard anything from my insurance about wegovy. [facility] didn’t fill my zepbound either. They didn’t tell me if my insurance covered it but I am assuming they didn’t. Can I just go ahead and set up for the compound injections with you now? I feel my appetite creeping back up.  Thanks,  Jesusita Jorgensen  [phone]

## 2024-02-09 ENCOUNTER — TELEPHONE (OUTPATIENT)
Dept: INTERNAL MEDICINE CLINIC | Facility: CLINIC | Age: 59
End: 2024-02-09

## 2024-02-09 NOTE — TELEPHONE ENCOUNTER
Coverage exception form received and filled out and faxed back to [facility]  at   Attached last note  Attached starting weight 206 and current weight 169# since starting wegovy in April 2023  Attached FDA notices  Tried phentermine in 2020    Awaiting decision

## 2024-02-12 NOTE — TELEPHONE ENCOUNTER
Fax from [employer] 2/9/24 stating wegovy 1.7 mg says product does not require authorization at this time and may be covered at the pharmacy in accordance with your benefit plan    [id number]

## 2024-04-10 RX ORDER — SEMAGLUTIDE 1.7 MG/.75ML
INJECTION, SOLUTION SUBCUTANEOUS
Qty: 3 ML | Refills: 2 | OUTPATIENT
Start: 2024-04-10

## 2024-04-10 NOTE — TELEPHONE ENCOUNTER
Requesting   Requested Prescriptions     Pending Prescriptions Disp Refills    WEGOVY 1.7 MG/0.75ML Subcutaneous Solution Auto-injector [Pharmacy Med Name: WEGOVY 1.7MG/0.75ML PF PEN INJ] 3 mL 2     Sig: ADMINISTER 1.7 MG UNDER THE SKIN 1 TIME A WEEK     LOV: 1/19/24  RTC: not noted  Filled: 12/14/23 #3 with 2 refills    Future Appointments   Date Time Provider Department Center   4/24/2024 11:40 AM Betsy Pelaez MD [facility]     Pt was switched to zepbound

## 2024-04-13 NOTE — TELEPHONE ENCOUNTER
Requesting   Requested Prescriptions     Pending Prescriptions Disp Refills    semaglutide-weight management (WEGOVY) 1.7 MG/0.75ML Subcutaneous Solution Auto-injector 3 mL 2     Sig: Inject 0.75 mL (1.7 mg total) into the skin once a week.       LOV: 1/19/24  RTC:   Last Relevant Labs:   Filled: 12/14/23 #3ml with 2 refills    Future Appointments   Date Time Provider Department Center   4/24/2024 11:40 AM Betsy Pelaez MD [facility]

## 2024-04-15 RX ORDER — SEMAGLUTIDE 1.7 MG/.75ML
1.7 INJECTION, SOLUTION SUBCUTANEOUS WEEKLY
Qty: 3 ML | Refills: 2 | Status: SHIPPED | OUTPATIENT
Start: 2024-04-15

## 2024-04-24 ENCOUNTER — OFFICE VISIT (OUTPATIENT)
Dept: INTERNAL MEDICINE CLINIC | Facility: CLINIC | Age: 59
End: 2024-04-24
Payer: COMMERCIAL

## 2024-04-24 VITALS
DIASTOLIC BLOOD PRESSURE: 70 MMHG | BODY MASS INDEX: 26.03 KG/M2 | WEIGHT: 162 LBS | HEART RATE: 63 BPM | HEIGHT: 66 IN | RESPIRATION RATE: 16 BRPM | SYSTOLIC BLOOD PRESSURE: 112 MMHG

## 2024-04-24 DIAGNOSIS — E78.5 HYPERLIPIDEMIA, UNSPECIFIED HYPERLIPIDEMIA TYPE: ICD-10-CM

## 2024-04-24 DIAGNOSIS — E66.09 CLASS 2 OBESITY DUE TO EXCESS CALORIES WITHOUT SERIOUS COMORBIDITY WITH BODY MASS INDEX (BMI) OF 35.0 TO 35.9 IN ADULT: ICD-10-CM

## 2024-04-24 DIAGNOSIS — F41.9 ANXIETY: ICD-10-CM

## 2024-04-24 DIAGNOSIS — Z51.81 THERAPEUTIC DRUG MONITORING: Primary | ICD-10-CM

## 2024-04-24 DIAGNOSIS — E03.9 ACQUIRED HYPOTHYROIDISM: ICD-10-CM

## 2024-04-24 PROBLEM — G43.709 CHRONIC MIGRAINE WITHOUT AURA: Status: ACTIVE | Noted: 2023-11-29

## 2024-04-24 PROBLEM — M54.2 NECK PAIN: Status: ACTIVE | Noted: 2023-11-29

## 2024-04-24 PROCEDURE — 3008F BODY MASS INDEX DOCD: CPT | Performed by: INTERNAL MEDICINE

## 2024-04-24 PROCEDURE — 3078F DIAST BP <80 MM HG: CPT | Performed by: INTERNAL MEDICINE

## 2024-04-24 PROCEDURE — 3074F SYST BP LT 130 MM HG: CPT | Performed by: INTERNAL MEDICINE

## 2024-04-24 PROCEDURE — 99214 OFFICE O/P EST MOD 30 MIN: CPT | Performed by: INTERNAL MEDICINE

## 2024-04-24 NOTE — PROGRESS NOTES
HISTORY OF PRESENT ILLNESS  Chief Complaint   Patient presents with    Weight Check     Down  3        Jesusita Jorgensen is a 59 year old female here for follow up in medical weight loss program.     Denies chest pain, shortness of breath, dizziness, blurred vision, headache, paresthesia, nausea/vomiting.   Down 3 lb from previous   Reviewed 24 dietary recalls   Continues to have fruit/ cuties   Dinner: smallest meal of the day   Goal would like to improve exercise       Wt Readings from Last 6 Encounters:   04/24/24 162 lb (73.5 kg)   01/19/24 165 lb (74.8 kg)   06/15/23 195 lb (88.5 kg)   04/26/23 206 lb (93.4 kg)   04/04/23 206 lb (93.4 kg)   01/08/23 214 lb (97.1 kg)            Breakfast Lunch Dinner Snacks Fluids   REviewed             REVIEW OF SYSTEMS  GENERAL HEALTH: feels well otherwise, denied any fevers chills or night sweats   RESPIRATORY: denies shortness of breath   CARDIOVASCULAR: denies chest pain  GI: denies abdominal pain    EXAM  /70   Pulse 63   Resp 16   Ht 5' 6\" (1.676 m)   Wt 162 lb (73.5 kg)   LMP 01/11/2022 (Approximate)   BMI 26.15 kg/m²   GENERAL: well developed, well nourished,in no apparent distress, A/O x3  SKIN: no rashes,no suspicious lesions  HEENT: atraumatic, normocephalic, OP-clear, PERRL  NECK: supple,no adenopathy  LUNGS: clear to auscultation bilaterally   CARDIO: RRR without murmur  GI: good BS's,NT/ND, no masses or HSM  EXTREMITIES: no cyanosis, no clubbing, no edema          Lab Results   Component Value Date    WBC 5.2 01/26/2023    RBC 4.78 01/26/2023    HGB 14.4 01/26/2023    HCT 43.5 01/26/2023    MCV 91.0 01/26/2023    MCH 30.1 01/26/2023    MCHC 33.1 01/26/2023    RDW 12.7 01/26/2023    .0 01/26/2023     Lab Results   Component Value Date     (H) 01/26/2023    BUN 12 01/26/2023    BUNCREA 17.6 04/19/2019    CREATSERUM 0.81 01/26/2023    ANIONGAP 2 01/26/2023    GFR 75 02/25/2017    GFRNAA 80 04/25/2022    GFRAA 92 04/25/2022    CA 9.0  01/26/2023    OSMOCALC 292 01/26/2023    ALKPHO 71 01/26/2023    AST 18 01/26/2023    ALT 18 01/26/2023    BILT 0.4 01/26/2023    TP 6.6 01/26/2023    ALB 3.8 01/26/2023    GLOBULIN 2.8 01/26/2023     01/26/2023    K 4.1 01/26/2023     01/26/2023    CO2 30.0 01/26/2023     Lab Results   Component Value Date     04/04/2023    A1C 5.5 04/04/2023     Lab Results   Component Value Date    CHOLEST 213 (H) 01/26/2023    TRIG 110 01/26/2023    HDL 56 01/26/2023     (H) 01/26/2023    VLDL 20 01/26/2023    TCHDLRATIO 3.04 04/16/2018    NONHDLC 157 (H) 01/26/2023     Lab Results   Component Value Date    T4F 1.3 01/26/2023    TSH 0.882 06/03/2023     Lab Results   Component Value Date    B12 409 04/04/2023     Lab Results   Component Value Date    VITD 25.0 (L) 04/04/2023       Current Outpatient Medications on File Prior to Visit   Medication Sig Dispense Refill    semaglutide-weight management (WEGOVY) 1.7 MG/0.75ML Subcutaneous Solution Auto-injector Inject 0.75 mL (1.7 mg total) into the skin once a week. 3 mL 2    NURTEC 75 MG Oral Tablet Dispersible PLACE 1 TABLET (75 MG) ON TOP OF TONGUE, ALLOW TO DISSOLVE THEN SWALLOW BY TRANSLINGUAL ROUTE EVERY OTHER DAY      ALPRAZolam 0.25 MG Oral Tab Take 1 tablet (0.25 mg total) by mouth nightly as needed. 30 tablet 0    buPROPion  MG Oral Tablet 24 Hr Take 1 tablet (150 mg total) by mouth daily. 90 tablet 1    levothyroxine 112 MCG Oral Tab Take 1 tablet (112 mcg total) by mouth before breakfast. 90 tablet 3    Methylphenidate HCl ER, CD, 30 MG Oral Cap CR Take 1 capsule (30 mg total) by mouth daily.      ergocalciferol 1.25 MG (30038 UT) Oral Cap Take 1 capsule (50,000 Units total) by mouth once a week. With food for 12 weeks total then begin OTC Vitamin D 2000 units daily with food thereafter 12 capsule 0    fluticasone propionate 50 MCG/ACT Nasal Suspension 2 sprays by Each Nare route daily. 1 each 0    Cholecalciferol (VITAMIN D) 1000 UNITS  Oral Tab Take 1,000 Units by mouth daily.      ibuprofen 200 MG Oral Tab Take 2 tablets (400 mg total) by mouth 2 (two) times daily.       No current facility-administered medications on file prior to visit.       ASSESSMENT  Analyzed weight data:       Diagnoses and all orders for this visit:    Therapeutic drug monitoring    Class 2 obesity due to excess calories without serious comorbidity with body mass index (BMI) of 35.0 to 35.9 in adult    Hyperlipidemia, unspecified hyperlipidemia type    Acquired hypothyroidism    Anxiety    Other orders  -     semaglutide-weight management 1.7 MG/0.75ML Subcutaneous Solution Auto-injector; Inject 0.75 mL (1.7 mg total) into the skin once a week.            PLAN  Initital consult 4/23 at 206 lb   Down 3 lb   Down 44 lb total   Doing excellent with weight loss   Continue to encourage strength training  Total time spent on chart review, pre-charting, obtaining history, counseling, and educating, reviewing labs was 30 minutes.    Continue wegovy at 1.7 mg q weekly   -advised of side effects and adverse effects of this medication  Increase strength training   We reviewed body recomposition at goal / close to goal   Reviewed pro/con of long term medication management  -advised of side effects and adverse effects of this medication  Nutrition: low carb diet/ recommended to eat breakfast daily/ regular protein intake  Medication use and side effects reviewed with patient.  Medication contraindications: n/a  Follow up with dietitian and psychologist as recommended.  Discussed the role of sleep and stress in weight management.  Counseled on comprehensive weight loss plan including attention to nutrition, exercise and behavior/stress management for success. See patient instruction below for more details.  Discussed strategies to overcome barriers to successful weight loss and weight maintenance  FITTE: ACSM recommendations (150-300 minutes/ week in active weight loss)   Weight Loss  consent to treat reviewed and signed   There are no Patient Instructions on file for this visit.    No follow-ups on file.    Patient verbalizes understanding.    Betsy Pelaez MD

## 2024-04-25 RX ORDER — BUPROPION HYDROCHLORIDE 150 MG/1
150 TABLET ORAL DAILY
Qty: 90 TABLET | Refills: 1 | Status: SHIPPED | OUTPATIENT
Start: 2024-04-25

## 2024-04-25 NOTE — TELEPHONE ENCOUNTER
BUPROPION XL 150MG TABLETS (24 H)          Will file in chart as: BUPROPION  MG Oral Tablet 24 Hr    Sig: TAKE 1 TABLET(150 MG) BY MOUTH DAILY    Disp: 90 tablet    Refills: 1 (Pharmacy requested: Not specified)    Start: 4/24/2024    Class: Normal    Non-formulary For: Anxiety, Class 2 obesity due to excess calories without serious comorbidity with body mass index (BMI) of 35.0 to 35.9 in adult    Last ordered: 6 months ago (10/2/2023) by Jannette Godinez MD    Last refill: 3/19/2024    Rx #: [id number]    Psychiatric Non-Scheduled (Anti-Anxiety) [id number]/24/2024 09:39 PM   Protocol Details In person appointment or virtual visit in the past 6 mos or appointment in next 3 mos    Depression Screening completed within the past 12 months      To be filled at: [facility] #[id number] [facility] [address] AT [facility] [address], [phone], [phone]     LOV: 4/26/23 for physical  RTC: 1 year  Filled: 10/2/23 #90 with 1 refills    Future Appointments   Date Time Provider Department Center   5/21/2024  8:20 AM Jannette Godinez MD [facility] [address] [facility] [address]   8/12/2024 11:20 AM Betsy Pelaez MD [facility] [address]     Refill request approved per protocol.

## 2024-05-12 ENCOUNTER — OFFICE VISIT (OUTPATIENT)
Dept: FAMILY MEDICINE CLINIC | Facility: CLINIC | Age: 59
End: 2024-05-12
Payer: COMMERCIAL

## 2024-05-12 VITALS
HEIGHT: 66 IN | BODY MASS INDEX: 25.88 KG/M2 | RESPIRATION RATE: 16 BRPM | DIASTOLIC BLOOD PRESSURE: 82 MMHG | OXYGEN SATURATION: 98 % | HEART RATE: 61 BPM | TEMPERATURE: 97 F | WEIGHT: 161 LBS | SYSTOLIC BLOOD PRESSURE: 110 MMHG

## 2024-05-12 DIAGNOSIS — J01.00 ACUTE NON-RECURRENT MAXILLARY SINUSITIS: Primary | ICD-10-CM

## 2024-05-12 DIAGNOSIS — R05.1 ACUTE COUGH: ICD-10-CM

## 2024-05-12 PROCEDURE — 99213 OFFICE O/P EST LOW 20 MIN: CPT | Performed by: NURSE PRACTITIONER

## 2024-05-12 PROCEDURE — 3074F SYST BP LT 130 MM HG: CPT | Performed by: NURSE PRACTITIONER

## 2024-05-12 PROCEDURE — 3079F DIAST BP 80-89 MM HG: CPT | Performed by: NURSE PRACTITIONER

## 2024-05-12 PROCEDURE — 3008F BODY MASS INDEX DOCD: CPT | Performed by: NURSE PRACTITIONER

## 2024-05-12 RX ORDER — AMOXICILLIN AND CLAVULANATE POTASSIUM 875; 125 MG/1; MG/1
1 TABLET, FILM COATED ORAL 2 TIMES DAILY
Qty: 20 TABLET | Refills: 0 | Status: SHIPPED | OUTPATIENT
Start: 2024-05-12 | End: 2024-05-22

## 2024-05-12 RX ORDER — METHYLPREDNISOLONE 4 MG/1
TABLET ORAL
Qty: 1 EACH | Refills: 0 | Status: SHIPPED | OUTPATIENT
Start: 2024-05-12

## 2024-05-12 NOTE — PROGRESS NOTES
CHIEF COMPLAINT:     Chief Complaint   Patient presents with    Sinus Problem     C/o sinus congestion, triggering migraines, lost voice 4-5 days, SOB, fatigued, fever at sx onset   Sx onset 4/25/24   OTC Flonase, Allegra        HPI:   Jesusita Jorgensen is a 59 year old female who presents for sinus congestion for  2.5 weeks. Symptoms have been worsening since onset. Sinus congestion/pain is described as a pressure and is located mainly max.  Reports minimal nasal discharge, feels more stuffed.  Pain pressure worse on R side, worse with bending over.   OTCmeds makes symptoms better. Has treated symptoms with otc.  Patient also reports headache, migraines at times,  dry cough.Reports post nasal drip.  Denies fever, dental pain, tinnitus, N/V/D.        Current Outpatient Medications   Medication Sig Dispense Refill    amoxicillin clavulanate 875-125 MG Oral Tab Take 1 tablet by mouth 2 (two) times daily for 10 days. 20 tablet 0    methylPREDNISolone (MEDROL) 4 MG Oral Tablet Therapy Pack As directed. 1 each 0    BUPROPION  MG Oral Tablet 24 Hr TAKE 1 TABLET(150 MG) BY MOUTH DAILY 90 tablet 1    semaglutide-weight management 1.7 MG/0.75ML Subcutaneous Solution Auto-injector Inject 0.75 mL (1.7 mg total) into the skin once a week. 9 mL 1    semaglutide-weight management (WEGOVY) 1.7 MG/0.75ML Subcutaneous Solution Auto-injector Inject 0.75 mL (1.7 mg total) into the skin once a week. 3 mL 2    NURTEC 75 MG Oral Tablet Dispersible PLACE 1 TABLET (75 MG) ON TOP OF TONGUE, ALLOW TO DISSOLVE THEN SWALLOW BY TRANSLINGUAL ROUTE EVERY OTHER DAY      ALPRAZolam 0.25 MG Oral Tab Take 1 tablet (0.25 mg total) by mouth nightly as needed. 30 tablet 0    levothyroxine 112 MCG Oral Tab Take 1 tablet (112 mcg total) by mouth before breakfast. 90 tablet 3    Methylphenidate HCl ER, CD, 30 MG Oral Cap CR Take 1 capsule (30 mg total) by mouth daily.      ergocalciferol 1.25 MG (09968 UT) Oral Cap Take 1 capsule (50,000 Units  total) by mouth once a week. With food for 12 weeks total then begin OTC Vitamin D 2000 units daily with food thereafter 12 capsule 0    fluticasone propionate 50 MCG/ACT Nasal Suspension 2 sprays by Each Nare route daily. 1 each 0    Cholecalciferol (VITAMIN D) 1000 UNITS Oral Tab Take 1,000 Units by mouth daily.      ibuprofen 200 MG Oral Tab Take 2 tablets (400 mg total) by mouth 2 (two) times daily.        Past Medical History:    Acquired hypothyroidism    ADD (attention deficit disorder) without hyperactivity    Dr. Bourne    Allergic rhinitis    mold, neg for pollen, but clinical rhinitis when goes outside    Anesthesia complication    Anxiety    Breast cancer (HCC)    R mastectomy, chemo (tamoxifen x 5 yrs ended 5/20) pelvic exam yearly while on it    Depression    Diarrhea, unspecified    seems related to stress    Dysmenorrhea    Flatulence/gas pain/belching    not constant. more often this month    Heartburn    3 or 4 times per year    Hemorrhoids    after having 10 lb. baby    History of blood transfusion    IBS (irritable bowel syndrome)    Migraines    Pituitary microadenoma (HCC)    PONV (postoperative nausea and vomiting)    Reactive airway disease (HCC)    exercise, ?allergies?    Sjogren's syndrome (HCC)    sees Dr. Grayson    Visual impairment    glasses    Vitamin D insufficiency      Past Surgical History:   Procedure Laterality Date    Appendectomy      Colonoscopy N/A 7/23/2019    repeat 5 yrs, [facility]: Sandeep Leal MD;  Location:  ENDOSCOPY    Hysterectomy  02/08/2022    + B oophorectomy Dr. Monico Dougherty at [facility], no further paps needed    Mastectomy right Right 2014    Other surgical history      dog bite, R chin      Family History   Problem Relation Age of Onset    Lipids Mother     Cancer Mother         lung (smoker)    Hypertension Father     Heart Attack Father 57    Breast Cancer Sister 53    Skin cancer Brother         melanoma    No Known Problems Brother      No Known Problems Brother     No Known Problems Brother     No Known Problems Brother     No Known Problems Maternal Grandmother     No Known Problems Maternal Grandfather     No Known Problems Paternal Grandmother     No Known Problems Paternal Grandfather     Diabetes Maternal Aunt       Social History     Socioeconomic History    Marital status:    Tobacco Use    Smoking status: Never    Smokeless tobacco: Never   Vaping Use    Vaping status: Never Used   Substance and Sexual Activity    Alcohol use: Yes     Comment: 4-5x/yr 1-2 drinks, never a problem    Drug use: No    Sexual activity: Yes     Partners: Male     Birth control/protection: Hysterectomy   Other Topics Concern    Caffeine Concern Yes     Comment: 2 cups of coffee x day    Exercise Yes     Comment: 5-6 x week, walking    Seat Belt Yes         REVIEW OF SYSTEMS:   GENERAL: feels well otherwise, no unplanned weight change,  ok appetite  SKIN: no rashes or abnormal skin lesions  HEENT: See HPI.    LUNGS: denies shortness of breath or wheezing, See HPI  CARDIOVASCULAR: denies chest pain or palpitations   GI: denies N/V/C or abdominal pain  NEURO: + sinus headaches.  No numbness or tingling in face.    EXAM:   /82   Pulse 61   Temp 97.4 °F (36.3 °C)   Resp 16   Ht 5' 6\" (1.676 m)   Wt 161 lb (73 kg)   LMP 01/11/2022 (Approximate)   SpO2 98%   BMI 25.99 kg/m²   GENERAL: well developed, well nourished,in no apparent distress  SKIN: no rashes,no suspicious lesions  HEAD: atraumatic, normocephalic, +  tenderness on palpation of maxillary sinuses  EYES: conjunctiva clear, EOM intact  EARS: TM's pearly, no bulging, no retraction, no fluid, bony landmarks visualized  NOSE: nostrils patent, crusted nasal mucous, nasal mucosa reddened and edematous  THROAT: oral mucosa pink, moist. No visible dental caries. Posterior pharynx is mildly erythematous. no exudates.  NECK: supple, non-tender  LUNGS: clear to auscultation bilaterally, no wheezes  or rhonchi.  No crackles/rales, good air movement throughout.Breathing is non labored.  CARDIO: RRR without murmur  EXTREMITIES: no cyanosis, clubbing or edema  LYMPH:  No cervical lymphadenopathy.        ASSESSMENT AND PLAN:     Jesusita Jorgensen is a 59 year old female who presents with   ASSESSMENT:   Encounter Diagnoses   Name Primary?    Acute non-recurrent maxillary sinusitis Yes    Acute cough        PLAN:  Pr. Requesting steroids as this has helped her sinus in the past when it has created lots of pain/pressure. Meds as below. Mucinex to help thin secretions.  Increase fluids and rest.  Comfort care as described in Patient Instructions.Follow up with PCP in 2-3 days if no improvement, sooner if worsening. If any difficulty breathing, SOB, or wheezing seek emergent care.       Meds & Refills for this Visit:  Requested Prescriptions     Signed Prescriptions Disp Refills    amoxicillin clavulanate 875-125 MG Oral Tab 20 tablet 0     Sig: Take 1 tablet by mouth 2 (two) times daily for 10 days.    methylPREDNISolone (MEDROL) 4 MG Oral Tablet Therapy Pack 1 each 0     Sig: As directed.       Risks, benefits, and side effects of medication explained and discussed.    There are no Patient Instructions on file for this visit.    The patient indicates understanding of these issues and agrees to the plan.  The patient is asked to return if sx's persist or worsen.    Increase fluids, Motrin/Tylenol prn, rest.  Patient is to follow up if fever greater than or equal to 100.4 persists for 72 hours.

## 2024-05-21 ENCOUNTER — OFFICE VISIT (OUTPATIENT)
Dept: FAMILY MEDICINE CLINIC | Facility: CLINIC | Age: 59
End: 2024-05-21

## 2024-05-21 ENCOUNTER — LAB ENCOUNTER (OUTPATIENT)
Dept: LAB | Age: 59
End: 2024-05-21
Attending: FAMILY MEDICINE

## 2024-05-21 ENCOUNTER — TELEPHONE (OUTPATIENT)
Dept: FAMILY MEDICINE CLINIC | Facility: CLINIC | Age: 59
End: 2024-05-21

## 2024-05-21 VITALS
RESPIRATION RATE: 16 BRPM | DIASTOLIC BLOOD PRESSURE: 70 MMHG | OXYGEN SATURATION: 98 % | BODY MASS INDEX: 25.55 KG/M2 | TEMPERATURE: 98 F | HEART RATE: 63 BPM | HEIGHT: 66 IN | SYSTOLIC BLOOD PRESSURE: 108 MMHG | WEIGHT: 159 LBS

## 2024-05-21 DIAGNOSIS — Z00.00 ROUTINE MEDICAL EXAM: Primary | ICD-10-CM

## 2024-05-21 DIAGNOSIS — Z12.11 COLON CANCER SCREENING: ICD-10-CM

## 2024-05-21 DIAGNOSIS — Z00.00 LABORATORY EXAM ORDERED AS PART OF ROUTINE GENERAL MEDICAL EXAMINATION: ICD-10-CM

## 2024-05-21 DIAGNOSIS — Z85.3 HISTORY OF RIGHT BREAST CANCER: ICD-10-CM

## 2024-05-21 DIAGNOSIS — E55.9 VITAMIN D DEFICIENCY: ICD-10-CM

## 2024-05-21 DIAGNOSIS — D35.2 PITUITARY MICROADENOMA (HCC): ICD-10-CM

## 2024-05-21 DIAGNOSIS — E03.9 ACQUIRED HYPOTHYROIDISM: ICD-10-CM

## 2024-05-21 DIAGNOSIS — Z12.31 ENCOUNTER FOR SCREENING MAMMOGRAM FOR MALIGNANT NEOPLASM OF BREAST: ICD-10-CM

## 2024-05-21 PROBLEM — M54.2 NECK PAIN: Status: RESOLVED | Noted: 2023-11-29 | Resolved: 2024-05-21

## 2024-05-21 LAB
ALBUMIN SERPL-MCNC: 3.7 G/DL (ref 3.4–5)
ALBUMIN/GLOB SERPL: 1.3 {RATIO} (ref 1–2)
ALP LIVER SERPL-CCNC: 50 U/L
ALT SERPL-CCNC: 19 U/L
ANION GAP SERPL CALC-SCNC: 2 MMOL/L (ref 0–18)
AST SERPL-CCNC: 12 U/L (ref 15–37)
BASOPHILS # BLD AUTO: 0.02 X10(3) UL (ref 0–0.2)
BASOPHILS NFR BLD AUTO: 0.5 %
BILIRUB SERPL-MCNC: 0.6 MG/DL (ref 0.1–2)
BUN BLD-MCNC: 13 MG/DL (ref 9–23)
CALCIUM BLD-MCNC: 8.9 MG/DL (ref 8.5–10.1)
CHLORIDE SERPL-SCNC: 111 MMOL/L (ref 98–112)
CHOLEST SERPL-MCNC: 186 MG/DL (ref ?–200)
CO2 SERPL-SCNC: 29 MMOL/L (ref 21–32)
CREAT BLD-MCNC: 1.08 MG/DL
EGFRCR SERPLBLD CKD-EPI 2021: 60 ML/MIN/1.73M2 (ref 60–?)
EOSINOPHIL # BLD AUTO: 0.2 X10(3) UL (ref 0–0.7)
EOSINOPHIL NFR BLD AUTO: 4.7 %
ERYTHROCYTE [DISTWIDTH] IN BLOOD BY AUTOMATED COUNT: 13.1 %
FASTING PATIENT LIPID ANSWER: YES
FASTING STATUS PATIENT QL REPORTED: YES
FSH SERPL-ACNC: 68.6 MIU/ML
GLOBULIN PLAS-MCNC: 2.8 G/DL (ref 2.8–4.4)
GLUCOSE BLD-MCNC: 84 MG/DL (ref 70–99)
HCT VFR BLD AUTO: 42 %
HDLC SERPL-MCNC: 58 MG/DL (ref 40–59)
HGB BLD-MCNC: 13.9 G/DL
IMM GRANULOCYTES # BLD AUTO: 0.01 X10(3) UL (ref 0–1)
IMM GRANULOCYTES NFR BLD: 0.2 %
LDLC SERPL CALC-MCNC: 110 MG/DL (ref ?–100)
LH SERPL-ACNC: 29.2 MIU/ML
LYMPHOCYTES # BLD AUTO: 1.37 X10(3) UL (ref 1–4)
LYMPHOCYTES NFR BLD AUTO: 31.9 %
MCH RBC QN AUTO: 30 PG (ref 26–34)
MCHC RBC AUTO-ENTMCNC: 33.1 G/DL (ref 31–37)
MCV RBC AUTO: 90.7 FL
MONOCYTES # BLD AUTO: 0.54 X10(3) UL (ref 0.1–1)
MONOCYTES NFR BLD AUTO: 12.6 %
NEUTROPHILS # BLD AUTO: 2.16 X10 (3) UL (ref 1.5–7.7)
NEUTROPHILS # BLD AUTO: 2.16 X10(3) UL (ref 1.5–7.7)
NEUTROPHILS NFR BLD AUTO: 50.1 %
NONHDLC SERPL-MCNC: 128 MG/DL (ref ?–130)
OSMOLALITY SERPL CALC.SUM OF ELEC: 293 MOSM/KG (ref 275–295)
PLATELET # BLD AUTO: 263 10(3)UL (ref 150–450)
POTASSIUM SERPL-SCNC: 4.1 MMOL/L (ref 3.5–5.1)
PROLACTIN SERPL-MCNC: 7.6 NG/ML
PROT SERPL-MCNC: 6.5 G/DL (ref 6.4–8.2)
RBC # BLD AUTO: 4.63 X10(6)UL
SODIUM SERPL-SCNC: 142 MMOL/L (ref 136–145)
T4 FREE SERPL-MCNC: 1.1 NG/DL (ref 0.8–1.7)
TRIGL SERPL-MCNC: 100 MG/DL (ref 30–149)
TSI SER-ACNC: 0.68 MIU/ML (ref 0.36–3.74)
VIT D+METAB SERPL-MCNC: 51.9 NG/ML (ref 30–100)
VLDLC SERPL CALC-MCNC: 17 MG/DL (ref 0–30)
WBC # BLD AUTO: 4.3 X10(3) UL (ref 4–11)

## 2024-05-21 PROCEDURE — 84439 ASSAY OF FREE THYROXINE: CPT | Performed by: FAMILY MEDICINE

## 2024-05-21 PROCEDURE — 99213 OFFICE O/P EST LOW 20 MIN: CPT | Performed by: FAMILY MEDICINE

## 2024-05-21 PROCEDURE — 3078F DIAST BP <80 MM HG: CPT | Performed by: FAMILY MEDICINE

## 2024-05-21 PROCEDURE — 83003 ASSAY GROWTH HORMONE (HGH): CPT | Performed by: FAMILY MEDICINE

## 2024-05-21 PROCEDURE — 82306 VITAMIN D 25 HYDROXY: CPT | Performed by: FAMILY MEDICINE

## 2024-05-21 PROCEDURE — 99396 PREV VISIT EST AGE 40-64: CPT | Performed by: FAMILY MEDICINE

## 2024-05-21 PROCEDURE — 83002 ASSAY OF GONADOTROPIN (LH): CPT | Performed by: FAMILY MEDICINE

## 2024-05-21 PROCEDURE — 80050 GENERAL HEALTH PANEL: CPT | Performed by: FAMILY MEDICINE

## 2024-05-21 PROCEDURE — 3074F SYST BP LT 130 MM HG: CPT | Performed by: FAMILY MEDICINE

## 2024-05-21 PROCEDURE — 80061 LIPID PANEL: CPT | Performed by: FAMILY MEDICINE

## 2024-05-21 PROCEDURE — 84146 ASSAY OF PROLACTIN: CPT | Performed by: FAMILY MEDICINE

## 2024-05-21 PROCEDURE — 83001 ASSAY OF GONADOTROPIN (FSH): CPT | Performed by: FAMILY MEDICINE

## 2024-05-21 PROCEDURE — 3008F BODY MASS INDEX DOCD: CPT | Performed by: FAMILY MEDICINE

## 2024-05-21 RX ORDER — METHYLPHENIDATE HYDROCHLORIDE 40 MG/1
40 CAPSULE, EXTENDED RELEASE ORAL DAILY
COMMUNITY
Start: 2024-05-04

## 2024-05-21 NOTE — TELEPHONE ENCOUNTER
Received via fax the screening mammogram LEFT only report done on 3/21/24 at [facility] that's located at [address] in [place]. Health maintenance updated & report placed in Dr. Godinez in box for review.

## 2024-05-21 NOTE — PROGRESS NOTES
Chief Complaint   Patient presents with    Physical     States Mammogram was done back in March '24 at [facility]/records release obtained.       HPI:  Jesusita Joregnsen is a 59 year old female here today for preventative visit.        Imms- up to date with  tdap. Will discuss shingrix & covid..        Cervical cancer screening- Had 1 abnormal in the past and just repeated and was normal. No intervention needed. No h/o HPV or genital warts.   Cotesting done on 4/19/2019 by myself.  Is now s/p hysterectomy and B oophorectomy since 2/8/22  No further paps needed.        H/o R breast CA s/p mastectomy   L Breast cancer screening  -Sister had breast cancer and she had R breast cancer in 2014. Last L mamm done in March, record release signed to obtain. Will print out an order for next yr, so can be sent to us when done.  will get through Dr. August, breast surgeon.        Colon cancer screening- No early family h/o colon cancer. C-scope 7/23/19, repeat 5 yr (adenomatous polyp[facility]        Osteoporosis screening-  Dr. Grayson, rheum recommended dexa which was normal on 5/6/23.        Diet/exercise  Overweight  - working on this with the weight loss clinic. Taking semaglutide and methylphenidate through Dr. Pelaez. Was obese with max weight 215# on 4/2022. Now BMI 25.6 wt 159#.   Enjoys ruuning was compettive in [employer] S. And college. Is not a chore, enjoys it, but her headaches, fatigue and jt pain make it harder to do it.        Dental/Eye Check up-  Recommended pt see dentist once every 6 months for a cleaning and once every year for an eye exam.        Hypothyroidism-  doing well on levothyroxine 112mcg, due for recheck        Anxiety/Depression- Takes alprazolam when anxious and can't help her sleep, but thinks if she takes it too often gets depressed, causes Has. Takes 1-2 #/mo. Taking buproprion. Her kids have ADHD and she sometimes wonders about herself.        Vit D insufficiency-  25 on  4/4/23. Taking vit d 1000IU/d.        Migraines  Pituitary adenoma  -sees Jodi Straube NP from [facility] who found it, but recommended she see a neurosurgeon.   Sees Dr. Gretel allison through [facility]. Says she hasn't addressed this, b/c her  was dxed with non-hodgkin's lymphoma.      Pituitary microadenoma 12/21/22:   1. There is a 5 x 4 x 4 mm cystic focus within the superior posterior aspect of the pituitary tissue.  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  Clinical correlation with pituitary function tests is suggested.   2. There is a background of trace punctate foci of T2 hyperintense signal within the periventricular and subcortical white matter.  This is a nonspecific finding and may relate to the sequelae of migraine headaches or trace chronic small vessel ischemic disease among many other etiologies.   3. No acute infarct, acute intracranial hemorrhage, or hydrocephalus.       Past Medical History:    Acquired hypothyroidism    ADD (attention deficit disorder) without hyperactivity    Dr. Bourne    Allergic rhinitis    mold, neg for pollen, but clinical rhinitis when goes outside    Anesthesia complication    Anxiety    Depression    Diarrhea, unspecified    seems related to stress    Dysmenorrhea    Flatulence/gas pain/belching    not constant. more often this month    Heartburn    3 or 4 times per year    Hemorrhoids    after having 10 lb. baby    History of blood transfusion    History of right breast cancer    R mastectomy, chemo (tamoxifen x 5 yrs ended 5/20) pelvic exam yearly while on it    IBS (irritable bowel syndrome)    Migraines    Pituitary microadenoma (HCC)    PONV (postoperative nausea and vomiting)    Reactive airway disease (HCC)    exercise, ?allergies?    Sjogren's syndrome (HCC)    sees Dr. Grayson    Visual impairment    glasses    Vitamin D insufficiency     Past Surgical History:   Procedure Laterality Date     Appendectomy      Colonoscopy N/A 07/23/2019    repeat 5 yrs, [facility]: Sandeep Leal MD;  Location:  ENDOSCOPY    Hysterectomy  02/08/2022    + B oophorectomy Dr. Monico Dougherty at [facility], no further paps needed    Mastectomy right Right 2014    Other surgical history      dog bite, R chin     Current Outpatient Medications on File Prior to Visit   Medication Sig Dispense Refill    Methylphenidate HCl ER, CD, 40 MG Oral Cap CR Take 1 capsule (40 mg total) by mouth daily.      amoxicillin clavulanate 875-125 MG Oral Tab Take 1 tablet by mouth 2 (two) times daily for 10 days. 20 tablet 0    BUPROPION  MG Oral Tablet 24 Hr TAKE 1 TABLET(150 MG) BY MOUTH DAILY 90 tablet 1    semaglutide-weight management 1.7 MG/0.75ML Subcutaneous Solution Auto-injector Inject 0.75 mL (1.7 mg total) into the skin once a week. 9 mL 1    NURTEC 75 MG Oral Tablet Dispersible PLACE 1 TABLET (75 MG) ON TOP OF TONGUE, ALLOW TO DISSOLVE THEN SWALLOW BY TRANSLINGUAL ROUTE EVERY OTHER DAY      ALPRAZolam 0.25 MG Oral Tab Take 1 tablet (0.25 mg total) by mouth nightly as needed. 30 tablet 0    levothyroxine 112 MCG Oral Tab Take 1 tablet (112 mcg total) by mouth before breakfast. 90 tablet 3    fluticasone propionate 50 MCG/ACT Nasal Suspension 2 sprays by Each Nare route daily. 1 each 0    Cholecalciferol (VITAMIN D) 1000 UNITS Oral Tab Take 1,000 Units by mouth daily.      ibuprofen 200 MG Oral Tab Take 2 tablets (400 mg total) by mouth 2 (two) times daily.       No current facility-administered medications on file prior to visit.     Allergies   Allergen Reactions    Hydrocodone NAUSEA AND VOMITING     Thinks codeine as well, hard time after surgery    Dust Mites UNKNOWN     Social History     Socioeconomic History    Marital status:      Spouse name: Not on file    Number of children: Not on file    Years of education: Not on file    Highest education level: Not on file   Occupational History    Not on  file   Tobacco Use    Smoking status: Never    Smokeless tobacco: Never   Vaping Use    Vaping status: Never Used   Substance and Sexual Activity    Alcohol use: Yes     Comment: 1 drink 4-5x/yr, never a problem (Jeanna)    Drug use: Never    Sexual activity: Yes     Partners: Male     Birth control/protection: Hysterectomy   Other Topics Concern    Caffeine Concern No    Exercise Yes    Seat Belt Yes    Special Diet No    Stress Concern Yes    Weight Concern Yes     Service Not Asked    Blood Transfusions Not Asked    Occupational Exposure Not Asked    Hobby Hazards Not Asked    Sleep Concern Not Asked    Back Care Not Asked    Bike Helmet Not Asked    Self-Exams Not Asked   Social History Narrative    Not on file     Social Determinants of Health     Financial Resource Strain: Not on file   Food Insecurity: Not on file   Transportation Needs: Not on file   Physical Activity: Not on file   Stress: Not on file   Social Connections: Not on file   Housing Stability: Not on file     Family History   Problem Relation Age of Onset    Lipids Mother     Cancer Mother         lung (smoker)    Pulmonary Disease Mother     Hypertension Father     Heart Attack Father 57    Breast Cancer Sister 53    Skin cancer Brother         melanoma    No Known Problems Brother     No Known Problems Brother     No Known Problems Brother     No Known Problems Brother     No Known Problems Maternal Grandmother     No Known Problems Maternal Grandfather     No Known Problems Paternal Grandmother     No Known Problems Paternal Grandfather     Diabetes Maternal Aunt        Review of Systems - All systems reviewed and negative except for HPI    PHYSICAL EXAM:  /70   Pulse 63   Temp 98 °F (36.7 °C) (Temporal)   Resp 16   Ht 5' 6\" (1.676 m)   Wt 159 lb (72.1 kg)   LMP 01/11/2022 (Approximate)   SpO2 98%   BMI 25.66 kg/m²   GENERAL APPEARANCE:  Alert, no acute distress, appears stated age  HEENT:  Head- Normocephalic,  atraumatic.    Eyes- Extraocular movements intact, pupils equally round and reactive to light,  conjunctivae normal.    Ears- Tympanic membranes intact bilaterally.    Nose- Patent, normal septum and turbinates.    Mouth/Throat- Normal oral mucosa, throat non-erythematous.  NECK:  No submental, submandibular, ant/post cervical lymphadenopathy. No thyromegaly or masses.  PULMONARY:  Lungs clear to auscultation bilaterally. No wheezes, rales, or rhonchi. Normal respiratory effort.  CARDIOVASCULAR:  Regular rate and rhythm. No murmurs, gallops, or rubs.  ABDOMEN:  + bowel sounds, soft, nontender, nondistended. No hepatomegaly.  MUSCULOSKELETAL: Strength of upper and lower extremities 5/5 bilaterally. Normal gait.  NEUROLOGIC:  Cranial nerves 2-12 grossly intact.  PSYCHIATRIC:  Normal mood, affect, and hygiene.   Breast: declined, says she will get through Dr. August, breast surgeon.    ASSESSMENT/PLAN:    1. Routine medical exam    2. Colon cancer screening  - Gastro Referral - In Network    3. Encounter for screening mammogram for malignant neoplasm of breast  - DWAIN JE 2D+3D SCREENING LEFT (CPT=77067-52/47040); Future    4. History of right breast cancer  - DWAIN JE 2D+3D SCREENING LEFT (CPT=77067-52/04360); Future    5. Laboratory exam ordered as part of routine general medical examination  - CBC W Differential W Platelet [E]; Future  - Comp Metabolic Panel (14) [E]; Future  - Lipid Panel [E]; Future    6. Acquired hypothyroidism  - TSH and Free T4 [E]; Future    7. Vitamin D deficiency  - Vitamin D [E]; Future    8. Pituitary microadenoma (HCC)  - Neurosurgery Referral - In Network  - FSH; Future  - LH (Luteinizing Hormone); Future  - Prolactin [E]; Future  - Growth Hormone, Serum [E]; Future  - TSH and Free T4 [E]; Future        Patient verbalized understanding and agrees to plan.      Return for f/u after seeing neurosurgeon/microadenoma eval.

## 2024-05-23 LAB — GROWTH HORMONE: 0.3 NG/ML

## 2024-05-27 DIAGNOSIS — R79.89 ELEVATED SERUM CREATININE: Primary | ICD-10-CM

## 2024-07-01 ENCOUNTER — PATIENT MESSAGE (OUTPATIENT)
Dept: FAMILY MEDICINE CLINIC | Facility: CLINIC | Age: 59
End: 2024-07-01

## 2024-07-01 NOTE — TELEPHONE ENCOUNTER
From: Jesusita Jorgensen  To: Jannette Godinez  Sent: 7/1/2024 9:41 AM CDT  Subject: Biometric Screening Form & kidney function response    Hi Dr. Godinez,  Thank you for your message about my kidney function. I very much appreciate how you stay on top of so many details involving my health. I have been avoiding ibuprofen and will come in August for those follow up tests.  I have attached my biometric screening forms for my health insurance. I can come in and sign those whenever you complete them. Thank you, and I hope you have a nice day.  Jesusita Jorgensen

## 2024-07-24 DIAGNOSIS — F41.9 ANXIETY: ICD-10-CM

## 2024-07-27 RX ORDER — SEMAGLUTIDE 1.7 MG/.75ML
INJECTION, SOLUTION SUBCUTANEOUS
Qty: 3 ML | Refills: 0 | OUTPATIENT
Start: 2024-07-27

## 2024-07-27 NOTE — TELEPHONE ENCOUNTER
Requesting   Requested Prescriptions     Pending Prescriptions Disp Refills    WEGOVY 1.7 MG/0.75ML Subcutaneous Solution Auto-injector [Pharmacy Med Name: WEGOVY 1.7MG/0.75ML INJ ( 4 PENS)] 3 mL 0     Sig: ADMINISTER 1.7 MG UNDER THE SKIN 1 TIME A WEEK       LOV:   RTC:   Last Relevant Labs:   Filled: 4/24/24 #9ml with 1 refills    Future Appointments   Date Time Provider Department Center   8/12/2024 11:20 AM Betsy Pelaez MD [facility]

## 2024-07-28 RX ORDER — ALPRAZOLAM 0.25 MG/1
0.25 TABLET ORAL NIGHTLY PRN
Qty: 30 TABLET | Refills: 0 | Status: SHIPPED | OUTPATIENT
Start: 2024-07-28

## 2024-08-12 ENCOUNTER — TELEMEDICINE (OUTPATIENT)
Dept: INTERNAL MEDICINE CLINIC | Facility: CLINIC | Age: 59
End: 2024-08-12
Payer: COMMERCIAL

## 2024-08-12 DIAGNOSIS — Z51.81 THERAPEUTIC DRUG MONITORING: Primary | ICD-10-CM

## 2024-08-12 DIAGNOSIS — E66.09 CLASS 2 OBESITY DUE TO EXCESS CALORIES WITHOUT SERIOUS COMORBIDITY WITH BODY MASS INDEX (BMI) OF 35.0 TO 35.9 IN ADULT: ICD-10-CM

## 2024-08-12 NOTE — PROGRESS NOTES
HISTORY OF PRESENT ILLNESS  Chief Complaint   Patient presents with    Weight Check     Video         Jesusitaayla Jorgensen is a 59 year old female here for follow up in medical weight loss program.   Currently at 155-160 lb   Staying consistent with healthy habits.   Feels that she is eating healthier and making better meal choices   Not eating junk foods and eating fruits and vegetables when she can   Struggling with work related sterss.   Struggles with walking and would like to walk more when can   Likes to bike on the weekends   HR up when she can         Wt Readings from Last 6 Encounters:   05/21/24 159 lb (72.1 kg)   05/12/24 161 lb (73 kg)   04/24/24 162 lb (73.5 kg)   01/19/24 165 lb (74.8 kg)   06/15/23 195 lb (88.5 kg)   04/26/23 206 lb (93.4 kg)            Breakfast Lunch Dinner Snacks Fluids   REviewed             REVIEW OF SYSTEMS  GENERAL HEALTH: feels well otherwise, denied any fevers chills or night sweats   RESPIRATORY: denies shortness of breath   CARDIOVASCULAR: denies chest pain  GI: denies abdominal pain    EXAM  LMP 01/11/2022 (Approximate)   GENERAL: well developed, well nourished,in no apparent distress, A/O x3  SKIN: no rashes,no suspicious lesions  HEENT: atraumatic, normocephalic, OP-clear, PERRL  NECK: supple,no adenopathy  LUNGS: clear to auscultation bilaterally   CARDIO: RRR without murmur  GI: good BS's,NT/ND, no masses or HSM  EXTREMITIES: no cyanosis, no clubbing, no edema          Lab Results   Component Value Date    WBC 4.3 05/21/2024    RBC 4.63 05/21/2024    HGB 13.9 05/21/2024    HCT 42.0 05/21/2024    MCV 90.7 05/21/2024    MCH 30.0 05/21/2024    MCHC 33.1 05/21/2024    RDW 13.1 05/21/2024    .0 05/21/2024     Lab Results   Component Value Date    GLU 84 05/21/2024    BUN 13 05/21/2024    BUNCREA 17.6 04/19/2019    CREATSERUM 1.08 (H) 05/21/2024    ANIONGAP 2 05/21/2024    GFR 75 02/25/2017    GFRNAA 80 04/25/2022    GFRAA 92 04/25/2022    CA 8.9 05/21/2024     OSMOCALC 293 05/21/2024    ALKPHO 50 05/21/2024    AST 12 (L) 05/21/2024    ALT 19 05/21/2024    BILT 0.6 05/21/2024    TP 6.5 05/21/2024    ALB 3.7 05/21/2024    GLOBULIN 2.8 05/21/2024     05/21/2024    K 4.1 05/21/2024     05/21/2024    CO2 29.0 05/21/2024     Lab Results   Component Value Date     04/04/2023    A1C 5.5 04/04/2023     Lab Results   Component Value Date    CHOLEST 186 05/21/2024    TRIG 100 05/21/2024    HDL 58 05/21/2024     (H) 05/21/2024    VLDL 17 05/21/2024    TCHDLRATIO 3.04 04/16/2018    NONHDLC 128 05/21/2024     Lab Results   Component Value Date    T4F 1.1 05/21/2024    TSH 0.681 05/21/2024     Lab Results   Component Value Date    B12 409 04/04/2023     Lab Results   Component Value Date    VITD 51.9 05/21/2024       Current Outpatient Medications on File Prior to Visit   Medication Sig Dispense Refill    ALPRAZolam 0.25 MG Oral Tab Take 1 tablet (0.25 mg total) by mouth nightly as needed. 30 tablet 0    Methylphenidate HCl ER, CD, 40 MG Oral Cap CR Take 1 capsule (40 mg total) by mouth daily.      BUPROPION  MG Oral Tablet 24 Hr TAKE 1 TABLET(150 MG) BY MOUTH DAILY 90 tablet 1    NURTEC 75 MG Oral Tablet Dispersible PLACE 1 TABLET (75 MG) ON TOP OF TONGUE, ALLOW TO DISSOLVE THEN SWALLOW BY TRANSLINGUAL ROUTE EVERY OTHER DAY      levothyroxine 112 MCG Oral Tab Take 1 tablet (112 mcg total) by mouth before breakfast. 90 tablet 3    fluticasone propionate 50 MCG/ACT Nasal Suspension 2 sprays by Each Nare route daily. 1 each 0    Cholecalciferol (VITAMIN D) 1000 UNITS Oral Tab Take 1,000 Units by mouth daily.      ibuprofen 200 MG Oral Tab Take 2 tablets (400 mg total) by mouth 2 (two) times daily.       No current facility-administered medications on file prior to visit.       ASSESSMENT  Analyzed weight data:       Diagnoses and all orders for this visit:    Therapeutic drug monitoring    Class 2 obesity due to excess calories without serious comorbidity  with body mass index (BMI) of 35.0 to 35.9 in adult    Other orders  -     semaglutide-weight management 1.7 MG/0.75ML Subcutaneous Solution Auto-injector; Inject 0.75 mL (1.7 mg total) into the skin once a week.              PLAN  Initital consult 4/23 at 206 lb   Weight stable   Down 44 lb total   Doing  excellent with weight loss   Continue to encourage strength training  Continue wegovy at 1.7 mg q weekly   -advised of side effects and adverse effects of this medication  Increase strength training   We reviewed body recomposition at goal / close to goal   Reviewed pro/con of long term medication management  -advised of side effects and adverse effects of this medication  Nutrition: low carb diet/ recommended to eat breakfast daily/ regular protein intake  Medication use and side effects reviewed with patient.  Medication contraindications: n/a  Follow up with dietitian and psychologist as recommended.  Discussed the role of sleep and stress in weight management.  Counseled on comprehensive weight loss plan including attention to nutrition, exercise and behavior/stress management for success. See patient instruction below for more details.  Discussed strategies to overcome barriers to successful weight loss and weight maintenance  FITTE: ACSM recommendations (150-300 minutes/ week in active weight loss)   Weight Loss consent to treat reviewed and signed   There are no Patient Instructions on file for this visit.    No follow-ups on file.    Patient verbalizes understanding.    Betsy Pelaez MD

## 2024-08-13 DIAGNOSIS — E03.9 ACQUIRED HYPOTHYROIDISM: ICD-10-CM

## 2024-08-14 RX ORDER — LEVOTHYROXINE SODIUM 112 UG/1
112 TABLET ORAL
Qty: 90 TABLET | Refills: 3 | Status: SHIPPED | OUTPATIENT
Start: 2024-08-14

## 2024-08-14 NOTE — TELEPHONE ENCOUNTER
levothyroxine 112 MCG Oral Tab         Sig: Take 1 tablet (112 mcg total) by mouth before breakfast.    Disp: 90 tablet    Refills: 3    Start: 8/13/2024    Class: Normal    Non-formulary For: Acquired hypothyroidism    Last ordered: 1 year ago (7/18/2023) by Jannette Godinez MD    Thyroid Medication Protocol [id number]/13/2024 04:47 PM   Protocol Details TSH in past 12 months    Last TSH value is normal    In person appointment or virtual visit in the past 12 mos or appointment in next 3 mos        LOV: 5/21/2024  RTC: none on file  Last Relevant Labs: 5/21/2024  Filled: 7/18/2023 #90 with 3 refills    Refill request approved per protocol.      Future Appointments   Date Time Provider Department Center   2/4/2025  1:20 PM Betsy Pelaez MD [facility]

## 2024-10-05 ENCOUNTER — LAB ENCOUNTER (OUTPATIENT)
Dept: LAB | Age: 59
End: 2024-10-05
Attending: FAMILY MEDICINE
Payer: COMMERCIAL

## 2024-10-05 DIAGNOSIS — R79.89 ELEVATED SERUM CREATININE: ICD-10-CM

## 2024-10-05 LAB
ANION GAP SERPL CALC-SCNC: 3 MMOL/L (ref 0–18)
BUN BLD-MCNC: 13 MG/DL (ref 9–23)
CALCIUM BLD-MCNC: 9.7 MG/DL (ref 8.7–10.4)
CHLORIDE SERPL-SCNC: 106 MMOL/L (ref 98–112)
CO2 SERPL-SCNC: 30 MMOL/L (ref 21–32)
CREAT BLD-MCNC: 0.94 MG/DL
EGFRCR SERPLBLD CKD-EPI 2021: 71 ML/MIN/1.73M2 (ref 60–?)
FASTING STATUS PATIENT QL REPORTED: NO
GLUCOSE BLD-MCNC: 84 MG/DL (ref 70–99)
OSMOLALITY SERPL CALC.SUM OF ELEC: 287 MOSM/KG (ref 275–295)
POTASSIUM SERPL-SCNC: 4.3 MMOL/L (ref 3.5–5.1)
SODIUM SERPL-SCNC: 139 MMOL/L (ref 136–145)

## 2024-10-05 PROCEDURE — 80048 BASIC METABOLIC PNL TOTAL CA: CPT | Performed by: FAMILY MEDICINE

## 2024-10-25 ENCOUNTER — OFFICE VISIT (OUTPATIENT)
Dept: SURGERY | Facility: CLINIC | Age: 59
End: 2024-10-25
Payer: COMMERCIAL

## 2024-10-25 VITALS
BODY MASS INDEX: 25.25 KG/M2 | HEART RATE: 78 BPM | SYSTOLIC BLOOD PRESSURE: 118 MMHG | DIASTOLIC BLOOD PRESSURE: 64 MMHG | WEIGHT: 159 LBS | HEIGHT: 66.5 IN

## 2024-10-25 DIAGNOSIS — Z86.69 HX OF MIGRAINES: ICD-10-CM

## 2024-10-25 DIAGNOSIS — E23.7 PITUITARY LESION (HCC): Primary | ICD-10-CM

## 2024-10-25 PROCEDURE — 3074F SYST BP LT 130 MM HG: CPT | Performed by: PHYSICIAN ASSISTANT

## 2024-10-25 PROCEDURE — 3078F DIAST BP <80 MM HG: CPT | Performed by: PHYSICIAN ASSISTANT

## 2024-10-25 PROCEDURE — 3008F BODY MASS INDEX DOCD: CPT | Performed by: PHYSICIAN ASSISTANT

## 2024-10-25 PROCEDURE — 99214 OFFICE O/P EST MOD 30 MIN: CPT | Performed by: PHYSICIAN ASSISTANT

## 2024-10-25 NOTE — PATIENT INSTRUCTIONS

## 2024-10-25 NOTE — PROGRESS NOTES
New patient:  Reason for visit:   Referred by Dr Godinez-pituitary microadenoma     Numeric Rating Scale:       Pain at Present: 0/10                                                                                                                   Prior diagnostic testing related to this condition:    MRI pituitary DOS 02/09/23  MRI brain DOS 12/21/22

## 2024-10-25 NOTE — H&P
Patient: Jesusita Jorgensen  Medical Record Number: [id number]  YOB: 1965  PCP: Jannette Godinez MD    Referring Physician: Jannette Godinez  Reason for visit: Pituitary lesion, establish care    Dear Dr. Jannette Godinez:  Thank you very much for requesting this consultation. I had the opportunity to evaluate and initiate care for your patient today, as per your request.    HISTORY OF CHIEF COMPLAINT:    Jesusita Jorgensen is a very pleasant 59 year old female, with a pertinent PMH of breast cancer s/p mastectomy, Sicca, Sjogren's, hypothyroidism, Vitamin D deficiency, migraines, anxiety, ADD  Presents today for a complaint of: Pituitary lesion, establish care  Patient was initially diagnosed with a pituitary lesion in December 2022.  An MRI of the brain was completed evaluating migraines, which she endorses she has had her whole life.  Pituitary lesion incidentally found.  This was reimaged with a dedicated pituitary MRI in February 2023.  Patient did not see neuro-ophthalmology or endocrinology at the time.  Her  was diagnosed with non-Hodgkin's lymphoma, and she was lost to follow-up.  Patient is looking to establish care.  She endorses pressure behind the eyes and that her vision is better on the right versus the left.  No peripheral visual issues endorsed.  Feels her eyelids are heavy/fatigued, L > R. No breast discharge.  She has gone through menopause and is status post hysterectomy.  She occasionally has hot flashes at night.  She does endorse dry skin, she has a history of Sjogren's. No recent MRI imaging. PCP did order some pituitary labs, appear WNL.     Past Medical History:    Acquired hypothyroidism    ADD (attention deficit disorder) without hyperactivity    Dr. Bourne    Allergic rhinitis    mold, neg for pollen, but clinical rhinitis when goes outside    Anesthesia complication    Anxiety    Constipation    Depression    Diarrhea, unspecified    seems related to  stress    Dysmenorrhea    Flatulence/gas pain/belching    not constant. more often this month    Frequent urination    Headache disorder    Heartburn    3 or 4 times per year    Hemorrhoids    after having 10 lb. baby    History of blood transfusion    History of depression    History of right breast cancer    R mastectomy, chemo (tamoxifen x 5 yrs ended 5/20) pelvic exam yearly while on it    IBS (irritable bowel syndrome)    Migraines    Pituitary microadenoma (HCC)    PONV (postoperative nausea and vomiting)    Reactive airway disease (HCC)    exercise, ?allergies?    Sjogren's syndrome (HCC)    sees Dr. Grayson    Stress    Visual impairment    glasses    Vitamin D insufficiency      Past Surgical History:   Procedure Laterality Date    Appendectomy      Colonoscopy N/A 07/23/2019    repeat 5 yrs, [facility]: Sandeep Leal MD;  Location:  ENDOSCOPY    Hysterectomy  02/08/2022    + B oophorectomy Dr. Monico Dougherty at [facility], no further paps needed    Mastectomy right Right 2014    Other surgical history      dog bite, R chin      Family History   Problem Relation Age of Onset    Alcohol and Other Disorders Associated Father     Hypertension Father     Heart Attack Father 57    Lipids Mother     Cancer Mother         lung (smoker)    Pulmonary Disease Mother     No Known Problems Maternal Grandmother     No Known Problems Maternal Grandfather     No Known Problems Paternal Grandmother     No Known Problems Paternal Grandfather     Breast Cancer Sister 53    Skin cancer Brother         melanoma    No Known Problems Brother     No Known Problems Brother     No Known Problems Brother     No Known Problems Brother     Mental Disorder Maternal Aunt     Diabetes Maternal Aunt     Mental Disorder Maternal Uncle       Social History     Socioeconomic History    Marital status:    Tobacco Use    Smoking status: Never    Smokeless tobacco: Never   Vaping Use    Vaping status: Never Used    Substance and Sexual Activity    Alcohol use: Yes     Comment: 1 drink 4-5x/yr, never a problem (Jeanna)    Drug use: Never    Sexual activity: Yes     Partners: Male     Birth control/protection: Hysterectomy   Other Topics Concern    Caffeine Concern No    Exercise Yes    Seat Belt Yes    Special Diet No    Stress Concern Yes    Weight Concern Yes      Allergies[1]   Current Medications:  Current Outpatient Medications   Medication Sig Dispense Refill    acetaminophen 500 MG Oral Tab Take 1 tablet (500 mg total) by mouth as needed for Pain.      levothyroxine 112 MCG Oral Tab Take 1 tablet (112 mcg total) by mouth before breakfast. 90 tablet 3    semaglutide-weight management 1.7 MG/0.75ML Subcutaneous Solution Auto-injector Inject 0.75 mL (1.7 mg total) into the skin once a week. 9 mL 1    ALPRAZolam 0.25 MG Oral Tab Take 1 tablet (0.25 mg total) by mouth nightly as needed. 30 tablet 0    Methylphenidate HCl ER, CD, 40 MG Oral Cap CR Take 1 capsule (40 mg total) by mouth daily.      BUPROPION  MG Oral Tablet 24 Hr TAKE 1 TABLET(150 MG) BY MOUTH DAILY 90 tablet 1    NURTEC 75 MG Oral Tablet Dispersible PLACE 1 TABLET (75 MG) ON TOP OF TONGUE, ALLOW TO DISSOLVE THEN SWALLOW BY TRANSLINGUAL ROUTE EVERY OTHER DAY      fluticasone propionate 50 MCG/ACT Nasal Suspension 2 sprays by Each Nare route daily. 1 each 0    Cholecalciferol (VITAMIN D) 1000 UNITS Oral Tab Take 1,000 Units by mouth daily.      ibuprofen 200 MG Oral Tab Take 2 tablets (400 mg total) by mouth 2 (two) times daily.          REVIEW OF SYSTEMS   Comprehensive review of systems done. Negative except what is outlined in the above HPI.     PHYSICAL EXAMIMATION    height is 66.5\" and weight is 159 lb (72.1 kg). Her blood pressure is 118/64 and her pulse is 78.   GENERAL: Very pleasant patient is in no apparent distress. Sitting comfortably in the examination chair.   HEENT: Normocephalic, atraumatic.  RESPIRATORY RATE: Easy and Even  SKIN: Warm  and dry  NEURO: Awake, alert and orientated. Speech fluent, comprehension intact, answering questions appropriately. EOM's intact. PERRL. Face symmetric. Tongue midline. Uvulae and palate elevate symmetrically. Negative drift. F2N intact.  Visual fields appear intact    SPINE:  Gait/Coordination: Gait deferred  Moving BUE and BLE spontaneously to full resistance     DATA:     Collected Updated Procedure    05/21/2024 0928 05/21/2024 1902 TSH and Free T4 [E] [[id number]]   Blood    Component Value Units   Free T4 1.1  ng/dL   TSH 0.681  mIU/mL          05/21/2024 0928 05/23/2024 0608 Growth Hormone, Serum [E] [[id number]]    Blood    Component Value Units   Growth Hormone 0.3 ng/mL          05/21/2024 0928 05/21/2024 1819 Prolactin [E] [[id number]]   Blood    Component Value Units   Prolactin 7.6  ng/mL          05/21/2024 0928 05/21/2024 1819 LH (Luteinizing Hormone) [[id number]]   Blood    Component Value Units   LH 29.2  mIU/mL          05/21/2024 0928 05/21/2024 1819 FSH [[id number]]   Blood    Component Value Units   FSH 68.6  mIU/mL              IMAGING:   Study Result    Narrative   PROCEDURE:  MRI PITUITARY (W+WO) (CPT=70553)     LOCATION:  [id number]       COMPARISON:  [facility], , MRI BRAIN (W+WO) (CPT=70553), 12/21/2022, 9:45 AM.     INDICATIONS:  E23.6 Abscess of pituitary (HCC)     TECHNIQUE:  MRI of the pituitary and brain was performed with thin section multi-planar T1, T2-weighted images with fat suppression imaging without and with contrast.        PATIENT STATED HISTORY:(As transcribed by Technologist)  The patient reports that this examination is for pituitary mass surveillance.      CONTRAST USED:  20 mL of Dotarem     FINDINGS:    The ventricles and sulci are within normal limits.  There is no midline shift or mass effect.  The basal cisterns are patent.       Within the superior posterior aspect of the pituitary tissue, there is a cystic focus measuring 5 x 4.5 x 5 mm which is unchanged to minimally  larger since 12/21/2022.     The posterior pituitary bright spot is present within the dorsal aspect of the sella.  The pituitary infundibulum is at midline and unremarkable.  The suprasellar cistern is clear.  The cavernous sinuses are symmetric and unremarkable.  The optic chiasm  is nondisplaced.     There is no acute intracranial hemorrhage or extra-axial fluid collection identified.       There are trace punctate foci of T2 hyperintense signal within the periventricular and subcortical white matter.  These are nonspecific in overall not significantly changed.       There is no abnormal parenchymal or leptomeningeal enhancement.  There is no restricted diffusion to suggest acute ischemia/infarction.     There is a trace amount of fluid within the right mastoid air cells.  There is trace mucosal thickening within the ethmoid sinus.  The expected major intracranial flow voids are present.                   Impression   CONCLUSION:  There is a 5 x 4.5 x 5 mm nonenhancing cystic focus within the superior posterior aspect of the pituitary tissue which is unchanged to minimally larger since 12/21/2022.  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  Continued imaging follow-up is suggested.           Dictated by (CST): Stromberg, LeRoy, MD on 2/09/2023 at 11:36 AM      Finalized by (CST): Stromberg, LeRoy, MD on 2/09/2023 at 11:48 AM     Study Result    Narrative   PROCEDURE:  MRI BRAIN (W+WO) (CPT=70553)     LOCATION:  [facility]       COMPARISON:  None.     INDICATIONS:  G89.29 Chronic neck pain M54.2 Chronic neck pain G43.709 Chronic migraine w/o aura w/o status migrainosus, not intractable     TECHNIQUE:  MRI of the brain was performed with multi-planar T1, T2-weighted images with FLAIR sequences and diffusion weighted images without and with infusion.     PATIENT STATED HISTORY:(As transcribed by Technologist)  Patient has chronic migraines and neck pain.      CONTRAST USED:  20 mL of  Dotarem     FINDINGS:    The ventricles and sulci are within normal limits.  There is no midline shift or mass effect.  The basal cisterns are patent.       Within the superior posterior aspect of the pituitary tissue, there is a cystic focus measuring 5 x 4 x 4 mm.     The posterior pituitary bright spot is present within the dorsal aspect of the sella.  The pituitary infundibulum is at midline and unremarkable.  The suprasellar cistern is clear.  The cavernous sinuses are symmetric and unremarkable.  The optic chiasm  is nondisplaced.     There is no acute intracranial hemorrhage or extra-axial fluid collection identified.  There are trace punctate foci of T2 hyperintense signal within the periventricular and subcortical white matter.  There is no abnormal parenchymal or leptomeningeal  enhancement.  There is no restricted diffusion to suggest acute ischemia/infarction.     There is a trace amount of fluid within the right mastoid air cells.  There is trace mucosal thickening within the ethmoid sinus.  The expected major intracranial flow voids are present.                   Impression   CONCLUSION:    1. There is a 5 x 4 x 4 mm cystic focus within the superior posterior aspect of the pituitary tissue.  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  Clinical correlation with pituitary function tests is suggested.  2. There is a background of trace punctate foci of T2 hyperintense signal within the periventricular and subcortical white matter.  This is a nonspecific finding and may relate to the sequelae of migraine headaches or trace chronic small vessel ischemic  disease among many other etiologies.  3. No acute infarct, acute intracranial hemorrhage, or hydrocephalus.        Dictated by (CST): Stromberg, LeRoy, MD on 12/21/2022 at 12:24 PM      Finalized by (CST): Stromberg, LeRoy, MD on 12/21/2022 at 12:38 PM       MEDICAL DECISION MAKING:     ASSESSMENT and PLAN:    ICD-10-CM    1.  Pituitary lesion (HCC)  E23.7 MRI PITUITARY (W+WO)(CPT=70553) ([facility])     Ophthalmology Referral - In Network     Endocrine Referral - In Network      2. Hx of migraines  Z86.69 Refer to Neurology        PLAN:   1. Medication: None prescribed  2. Imaging:    - Reviewed today:    - MRI pituitary 2023, MRI brain 2022:      - Agree with radiology, pituitary lesion without significant optic chiasm compression.    - Ordered today:    - MRI pituitary:     - Monitor pituitary lesion, which notes possible growth on 2023 scan  3. Referral:    - Endocrinology:     - Pituitary work up   - Neuro ophthalmology:    - VF assessment 2/2 to pituitary lesion   - Neurology:    - Patient with a hx of migraines, looking to become established with neurology   4. Follow up with Dr. Grijalva in 3-4 months in [address] or call or follow up sooner or go to the ED for any new, worsening or concerning signs or symptoms     I reviewed imaging. I discussed the plan and reviewed imaging with the patient. The patient agrees with the plan, verbalized understanding and is appreciative. All questions were sought out and thoroughly answered to satisfaction.       Total visit time: 45 min  More than 50% spent coordinating care, providing patient education, reviewing imaging, discussing further imaging, discussing endocrinology, discussing neuro ophthalmology, discussing neurology  and counseling.    Thank you very much for the kind referral.  Respectfully yours,    Annalise Seay M.S., MELVI  [address], [address]  [place] [address]  [phone]  10/25/2024 9:25 AM    Dragon speech recognition software was used to prepare this note. If a word or phrase is confusing, it is likely due to a failure of recognition. Please contact me with any questions or clarifications.         [1]   Allergies  Allergen Reactions    Hydrocodone NAUSEA AND VOMITING     Thinks codeine as well, hard time  after surgery    Dust Mites UNKNOWN

## 2024-11-13 PROBLEM — Z86.0101 PERSONAL HISTORY OF ADENOMATOUS AND SERRATED COLON POLYPS: Status: ACTIVE | Noted: 2024-11-13

## 2024-12-09 ENCOUNTER — PATIENT MESSAGE (OUTPATIENT)
Dept: SURGERY | Facility: CLINIC | Age: 59
End: 2024-12-09

## 2024-12-09 NOTE — TELEPHONE ENCOUNTER
Form completed and placed in Annalise's inbox for review and completion. Please return to nursing to send to [facility].

## 2024-12-13 ENCOUNTER — MED REC SCAN ONLY (OUTPATIENT)
Dept: SURGERY | Facility: CLINIC | Age: 59
End: 2024-12-13

## 2024-12-17 ENCOUNTER — TELEPHONE (OUTPATIENT)
Dept: SURGERY | Facility: CLINIC | Age: 59
End: 2024-12-17

## 2024-12-17 ENCOUNTER — HOSPITAL ENCOUNTER (OUTPATIENT)
Dept: MRI IMAGING | Facility: HOSPITAL | Age: 59
Discharge: HOME | End: 2024-12-17
Attending: PHYSICIAN ASSISTANT
Payer: COMMERCIAL

## 2024-12-17 DIAGNOSIS — E23.7 PITUITARY LESION (HCC): ICD-10-CM

## 2024-12-17 PROCEDURE — A9575 INJ GADOTERATE MEGLUMI 0.1ML: HCPCS | Performed by: PHYSICIAN ASSISTANT

## 2024-12-17 PROCEDURE — 70553 MRI BRAIN STEM W/O & W/DYE: CPT | Performed by: PHYSICIAN ASSISTANT

## 2024-12-17 RX ORDER — GADOTERATE MEGLUMINE 376.9 MG/ML
15 INJECTION INTRAVENOUS
Status: COMPLETED | OUTPATIENT
Start: 2024-12-17 | End: 2024-12-17

## 2024-12-17 RX ADMIN — GADOTERATE MEGLUMINE 15 ML: 376.9 INJECTION INTRAVENOUS at 16:14:00

## 2024-12-17 NOTE — TELEPHONE ENCOUNTER
Received consultation appointment confirmation DOS 02/25/25 from [facility], for review by provider.     Endorsed in neuro surgery bin for clinical staff.

## 2024-12-19 DIAGNOSIS — F41.9 ANXIETY: ICD-10-CM

## 2024-12-19 DIAGNOSIS — E66.09 CLASS 2 OBESITY DUE TO EXCESS CALORIES WITHOUT SERIOUS COMORBIDITY WITH BODY MASS INDEX (BMI) OF 35.0 TO 35.9 IN ADULT: ICD-10-CM

## 2024-12-19 DIAGNOSIS — E66.812 CLASS 2 OBESITY DUE TO EXCESS CALORIES WITHOUT SERIOUS COMORBIDITY WITH BODY MASS INDEX (BMI) OF 35.0 TO 35.9 IN ADULT: ICD-10-CM

## 2024-12-19 RX ORDER — BUPROPION HYDROCHLORIDE 150 MG/1
150 TABLET ORAL DAILY
Qty: 90 TABLET | Refills: 0 | Status: SHIPPED | OUTPATIENT
Start: 2024-12-19

## 2024-12-19 NOTE — TELEPHONE ENCOUNTER
Requesting Bupropion 150mg  LOV: 5/21/24 Physical  RTC: not noted  Last Relevant Labs: 5/21/24  Filled: 4/25/24 #90 with 1 refills    Future Appointments   Date Time Provider Department Center   2/4/2025  1:20 PM Betsy Pelaez MD [facility]   2/18/2025 10:30 AM Miguel Grijalva MD [id number] [facility]   2/26/2025  9:00 AM Coco Bustamante DO [id number] [facility]     Rx sent to pharmacy per protocol

## 2025-01-22 ENCOUNTER — OFFICE VISIT (OUTPATIENT)
Facility: CLINIC | Age: 60
End: 2025-01-22
Payer: COMMERCIAL

## 2025-01-22 VITALS
WEIGHT: 155 LBS | OXYGEN SATURATION: 98 % | HEART RATE: 65 BPM | HEIGHT: 66.5 IN | SYSTOLIC BLOOD PRESSURE: 118 MMHG | BODY MASS INDEX: 24.62 KG/M2 | DIASTOLIC BLOOD PRESSURE: 82 MMHG

## 2025-01-22 DIAGNOSIS — E06.3 HYPOTHYROIDISM DUE TO HASHIMOTO THYROIDITIS: ICD-10-CM

## 2025-01-22 DIAGNOSIS — E23.6 RATHKE'S CYST (HCC): ICD-10-CM

## 2025-01-22 DIAGNOSIS — D35.2 PITUITARY ADENOMA (HCC): ICD-10-CM

## 2025-01-22 DIAGNOSIS — D35.2 PITUITARY MICROADENOMA (HCC): Primary | ICD-10-CM

## 2025-01-22 PROCEDURE — 3008F BODY MASS INDEX DOCD: CPT | Performed by: STUDENT IN AN ORGANIZED HEALTH CARE EDUCATION/TRAINING PROGRAM

## 2025-01-22 PROCEDURE — 99205 OFFICE O/P NEW HI 60 MIN: CPT | Performed by: STUDENT IN AN ORGANIZED HEALTH CARE EDUCATION/TRAINING PROGRAM

## 2025-01-22 PROCEDURE — 3079F DIAST BP 80-89 MM HG: CPT | Performed by: STUDENT IN AN ORGANIZED HEALTH CARE EDUCATION/TRAINING PROGRAM

## 2025-01-22 PROCEDURE — 3074F SYST BP LT 130 MM HG: CPT | Performed by: STUDENT IN AN ORGANIZED HEALTH CARE EDUCATION/TRAINING PROGRAM

## 2025-01-22 NOTE — PATIENT INSTRUCTIONS
Summary of our visit:  We discussed about the structure and function of pituitary gland  We will repeat yearly MRI's and yearly hormonal testing for now  For hormonal testing:  Get am cortisol levels at 8 am, fasting   Let me know on my chart and I will put in orders for the next set of labs called dexamethasone suppression test  Take 1mg dexamethasone between 11PM and midnight, then have labs drawn at 8AM the following morning.  We will check your BMP, TPO, TFT  For MRI - due in 12/2025  Continue with levothyroxine 112 mcg for now. - Take Levothyroxine early morning every day, 60 mins before meals. Space 4 hrs from Ca, iron supplements. If taking biotin stop biotin 3-4 days before labs      General follow up information:  Please let us know if you require any refills at least 1 week prior to your medication running out. If you do run out of medication, please call our office ASAP to request refills (do not wait until your follow up).  Please call us if you experience any problems with insurance coverage of medication, lab work, or imaging.   Lab results and imaging will typically be reviewed at follow up appointments, or within 3-5 business days of ALL results being in if you do not have an appointment scheduled in the near future. Our office will contact you for any abnormal results requiring more urgent follow up or action.   The on-call pager is for urgent matters only. If you are a type 1 diabetic and run out of insulin after business hours 8AM-4PM, you may call the on-call pager for a refill to a 24 hour pharmacy. If you have adrenal insufficiency and run out of steroids, you may call the on-call pager for a refill to a 24 hour pharmacy. All other refill requests should be requested during business hours.    Return Visit   [x] Dr. Brown in 6 months   [x] Fasting/8AM labs

## 2025-01-22 NOTE — PROGRESS NOTES
Endocrinology Clinic Note    Name: Jesusita Jorgensen    Date: 1/22/2025       HISTORY OF PRESENT ILLNESS   Jesusita Jorgensen is a 59 year old female with PMHx significant for Rathke's cyst who presents for initial endocrine consultation today and to establish care.    Her PMH is pertinent for breast cancer s/p mastectomy, Sicca, Sjogren's, primary hypothyroidism, Vitamin D deficiency, migraines, anxiety, ADD, obesity -currently on semaglutide.  HPI:  -Patient reports having headaches for a long period of her life.    -12/2022 MRI of the brain done for migraines showed 5X4X 4 mm cystic focus within the superior posterior aspect of the pituitary tissue.  (Rathke's cleft cyst versus cystic pituitary microadenoma.  -Since then she has serial MRIs done in 2/2023 and most recently in 12/2024 which showed almost stable size.  -Has not seen an endocrinologist so far.  Lost to follow-up due to has been being diagnosed with non-Hodgkin's lymphoma and patient involved with his care.  Has been now doing well, stable for the past 1 year.  -She has been following with [facility].  -Has been referred to neuro-ophthalmology for VF assessment.  Has appointment in 3/2025.  -Regarding headaches she currently does not report significant change in frequency.  Does have some intermittent blurred vision (unsure if it is due to her Sjogren's) -since it may occur unrelated to the headache.  -Sx of Hormonal deficiency: (Positive sx in bold)  -Thyroid: fatigue, cold intolerance, weight gain, myalgia, hair or skin changes, depression, constipation, HTN, dyslipidemia  -Cortisol:  weight loss (on semaglutide for weight loss, lost 60 pounds since being on it, helped her Sjogrens), weakness, myalgia, lightheadedness, nausea/vomiting, diarrhea, hypoglycemia, hypotension,  -Growth hormone: achieved normal adult height  -Gonadal female: Surgical menopause in 2022.    -ADH: Reports feeling thirsty often and does have  nocturia at night x 3.    - Biochemical evaluation so far:    Posterior pituitary -serum sodium on 10/5/2024 is 139.    Gonadal axis -LH 29.2, FSH 68.6, E2 unavailable 5/21/2024    HPA axis -a.m. cortisol not available    Thyroid axis -Free T41.1, TSH 0.681. 5/21/2024    Growth hormone axis - GH 0.3 5/21/2024    Prolactin -7.6 5/21/2024    Patient also notes history of hypothyroidism: For several years.  Autoimmune conditions run in her family as patient also has Sjogren's syndrome.  Son with dermatomyositis as well as psoriatic arthritis.  -Family history of thyroid diseases  -No history of neck radiation  -No history of neck surgery  -Currently on levothyroxine 112 mcg.  -Not currently on multivitamins or biotin supplements.      PAST MEDICAL HISTORY:   Past Medical History:    Acquired hypothyroidism    ADD (attention deficit disorder) without hyperactivity    Dr. Bourne    Allergic rhinitis    mold, neg for pollen, but clinical rhinitis when goes outside    Anesthesia complication    Anxiety    Constipation    Depression    Diarrhea, unspecified    seems related to stress    Dysmenorrhea    Flatulence/gas pain/belching    not constant. more often this month    Frequent urination    Headache disorder    Heartburn    3 or 4 times per year    Hemorrhoids    after having 10 lb. baby    History of blood transfusion    History of depression    History of right breast cancer    R mastectomy, chemo (tamoxifen x 5 yrs ended 5/20) pelvic exam yearly while on it    IBS (irritable bowel syndrome)    Migraines    Pituitary microadenoma (HCC)    PONV (postoperative nausea and vomiting)    Reactive airway disease (HCC)    exercise, ?allergies?    Sjogren's syndrome (HCC)    sees Dr. Grayson    Stress    Visual impairment    glasses    Vitamin D insufficiency       PAST SURGICAL HISTORY:   Past Surgical History:   Procedure Laterality Date    Appendectomy      Colonoscopy N/A 07/23/2019    repeat 5 yrs, [facility]: Elvis,  MD Sandeep;  Location:  ENDOSCOPY    Colonoscopy  11/13/2024    repeat 3 yrs, many polyps, Dr. Leal, [facility]    Hysterectomy  02/08/2022    + B oophorectomy Dr. Monico Dougherty at [facility], no further paps needed    Mastectomy right Right 2014    Other surgical history      dog bite, R chin       CURRENT MEDICATIONS:    Current Outpatient Medications   Medication Sig Dispense Refill    buPROPion  MG Oral Tablet 24 Hr Take 1 tablet (150 mg total) by mouth daily. 90 tablet 0    acetaminophen 500 MG Oral Tab Take 1 tablet (500 mg total) by mouth as needed for Pain.      levothyroxine 112 MCG Oral Tab Take 1 tablet (112 mcg total) by mouth before breakfast. 90 tablet 3    semaglutide-weight management 1.7 MG/0.75ML Subcutaneous Solution Auto-injector Inject 0.75 mL (1.7 mg total) into the skin once a week. 9 mL 1    ALPRAZolam 0.25 MG Oral Tab Take 1 tablet (0.25 mg total) by mouth nightly as needed. 30 tablet 0    Methylphenidate HCl ER, CD, 40 MG Oral Cap CR Take 1 capsule (40 mg total) by mouth daily.      NURTEC 75 MG Oral Tablet Dispersible PLACE 1 TABLET (75 MG) ON TOP OF TONGUE, ALLOW TO DISSOLVE THEN SWALLOW BY TRANSLINGUAL ROUTE EVERY OTHER DAY      fluticasone propionate 50 MCG/ACT Nasal Suspension 2 sprays by Each Nare route daily. 1 each 0    Cholecalciferol (VITAMIN D) 1000 UNITS Oral Tab Take 1,000 Units by mouth daily.      ibuprofen 200 MG Oral Tab Take 2 tablets (400 mg total) by mouth 2 (two) times daily.       Endocrine Medications            levothyroxine 112 MCG Oral Tab            ALLERGIES:  Allergies[1]    SOCIAL HISTORY:    Social History     Socioeconomic History    Marital status:    Tobacco Use    Smoking status: Never    Smokeless tobacco: Never   Vaping Use    Vaping status: Never Used   Substance and Sexual Activity    Alcohol use: Yes     Comment: 1 drink 4-5x/yr, never a problem (Jeanna)    Drug use: Never    Sexual activity: Yes     Partners: Male      Birth control/protection: Hysterectomy   Other Topics Concern    Caffeine Concern No    Exercise Yes    Seat Belt Yes    Special Diet No    Stress Concern Yes    Weight Concern Yes       FAMILY HISTORY:   Family History   Problem Relation Age of Onset    Alcohol and Other Disorders Associated Father     Hypertension Father     Heart Attack Father 57    Lipids Mother     Cancer Mother         lung (smoker)    Pulmonary Disease Mother     No Known Problems Maternal Grandmother     No Known Problems Maternal Grandfather     No Known Problems Paternal Grandmother     No Known Problems Paternal Grandfather     Breast Cancer Sister 53    Skin cancer Brother         melanoma    No Known Problems Brother     No Known Problems Brother     No Known Problems Brother     No Known Problems Brother     Mental Disorder Maternal Aunt     Diabetes Maternal Aunt     Mental Disorder Maternal Uncle          REVIEW OF SYSTEMS:  Ten point review of systems has been performed and is otherwise negative and/or non-contributory, except as described above.      PHYSICAL EXAM:   Vitals:    01/22/25 0820   BP: 118/82   Pulse: 65   SpO2: 98%   Weight: 155 lb (70.3 kg)   Height: 5' 6.5\" (1.689 m)     BMI: Body mass index is 24.64 kg/m².     CONSTITUTIONAL:  awake, alert, cooperative, no apparent distress, and appears stated age  PSYCH: normal affect  EYES:  No proptosis,  conjunctiva normal  ENT:  Normocephalic, atraumatic  NECK:  Supple, symmetrical, thyroid not enlarged and no tenderness  LUNGS: breathing comfortably  CARDIOVASCULAR:  regular rate   ABDOMEN:  soft  SKIN:  no rashes and no lesions  EXTREMITIES: no edema      DATA:   Component      Latest Ref Rng 4/25/2022 1/26/2023 6/3/2023 5/21/2024   Sodium      136 - 145 mmol/L 141  141   142          Component      Latest Ref Rng 1/26/2023 6/3/2023 5/21/2024   T4,Free (Direct)      0.8 - 1.7 ng/dL 1.3   1.1    TSH      0.358 - 3.740 mIU/mL 0.305 (L)  0.882  0.681    Growth Hormone  Baseline      0.05 - 8.00 ng/mL 0.19      FSH      No established range for female sex mIU/mL 63.7   68.6    LH      No established range for female sex mIU/mL 27.1   29.2    GROWTH HORMONE      0.0 - 10.0 ng/mL   0.3       Legend:  (L) Low    12/17/24: MRI of the pituitary gland  RI PUTUITARY  PITUITARY:            5 x 5 mm high T2 signal structure in the posterior aspect of the pituitary with minimal T1 shortening is stable.  Diaphragma sella is unremarkable.     MRI BRAIN  FINDINGS:       The ventricles and sulci are within normal limits.       There is no midline shift or mass effect.       The basal cisterns are patent.       The craniocervical junction is unremarkable.       Midline structures including corpus callosum, optic chiasm and cerebellar tonsils are overall unremarkable.     There is no acute intracranial hemorrhage or extra-axial fluid collection identified.       Mild FLAIR abnormalities the white matter are stable.              There is no abnormal parenchymal or leptomeningeal enhancement.     There is no restricted diffusion to suggest acute ischemia/infarction.     The visualized paranasal sinuses and mastoid air cells are unremarkable.       The expected major intracranial flow voids are present.                   Impression   CONCLUSION:       1. 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             2/9/23: MRI of the pituitary gland with and without contrast  The ventricles and sulci are within normal limits.  There is no midline shift or mass effect.  The basal cisterns are patent.       Within the superior posterior aspect of the pituitary tissue, there is a cystic focus measuring 5 x 4.5 x 5 mm which is unchanged to minimally larger since 12/21/2022.     The posterior pituitary bright spot is present within the dorsal aspect of the sella.  The pituitary infundibulum is at midline and unremarkable.   The suprasellar cistern is clear.  The cavernous sinuses are symmetric and unremarkable.  The optic chiasm  is nondisplaced.     There is no acute intracranial hemorrhage or extra-axial fluid collection identified.       There are trace punctate foci of T2 hyperintense signal within the periventricular and subcortical white matter.  These are nonspecific in overall not significantly changed.       There is no abnormal parenchymal or leptomeningeal enhancement.  There is no restricted diffusion to suggest acute ischemia/infarction.     There is a trace amount of fluid within the right mastoid air cells.  There is trace mucosal thickening within the ethmoid sinus.  The expected major intracranial flow voids are present.                   Impression   CONCLUSION:  There is a 5 x 4.5 x 5 mm nonenhancing cystic focus within the superior posterior aspect of the pituitary tissue which is unchanged to minimally larger since 12/21/2022.  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  Continued imaging follow-up is suggested.     12/21/2022 MRI of the brain with and without contrast    FINDINGS:    The ventricles and sulci are within normal limits.  There is no midline shift or mass effect.  The basal cisterns are patent.       Within the superior posterior aspect of the pituitary tissue, there is a cystic focus measuring 5 x 4 x 4 mm.     The posterior pituitary bright spot is present within the dorsal aspect of the sella.  The pituitary infundibulum is at midline and unremarkable.  The suprasellar cistern is clear.  The cavernous sinuses are symmetric and unremarkable.  The optic chiasm  is nondisplaced.     There is no acute intracranial hemorrhage or extra-axial fluid collection identified.  There are trace punctate foci of T2 hyperintense signal within the periventricular and subcortical white matter.  There is no abnormal parenchymal or leptomeningeal  enhancement.  There is no restricted  diffusion to suggest acute ischemia/infarction.     There is a trace amount of fluid within the right mastoid air cells.  There is trace mucosal thickening within the ethmoid sinus.  The expected major intracranial flow voids are present.                   Impression   CONCLUSION:    1. There is a 5 x 4 x 4 mm cystic focus within the superior posterior aspect of the pituitary tissue.  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  Clinical correlation with pituitary function tests is suggested.  2. There is a background of trace punctate foci of T2 hyperintense signal within the periventricular and subcortical white matter.  This is a nonspecific finding and may relate to the sequelae of migraine headaches or trace chronic small vessel ischemic  disease among many other etiologies.  3. No acute infarct, acute intracranial hemorrhage, or hydrocephalus.          DXA 6/2023: Normal BMD    ASSESSMENT AND PLAN:    ICD-10-CM    1. Pituitary microadenoma (HCC)  D35.2       2. Hypothyroidism due to Hashimoto thyroiditis  E06.3 TSH and Free T4 [E]     Thyroid peroxidase & thyroglobulin ab     CANCELED: Thyroid Peroxidase (TPO) AB [E]      3. Rathke's cyst (HCC)  E23.6 Cortisol [E]     Basic Metabolic Panel (8) [E]      4. Pituitary adenoma (HCC)  D35.2 Cortisol [E]     Basic Metabolic Panel (8) [E]     ACTH, Plasma     IGF-1           #5 mm cystic focus within the superior posterior aspect of the pituitary gland (Rathke's cleft cyst versus cystic pituitary microadenoma)  -Stable since imaging from 12/2022, 2/2023, 12/2024  -We reviewed patients imaging and clinical history.   -We discussed that microadenomas are small and less than 1 cm  -Most adenomas stay in the pituitary gland or in the tissue around it, and they grow slowly  -We discussed that these can be functioning or nonfunctioning. They cause different symptoms depending on the kind of hormones they make. Functioning pituitary adenomas  fall into several categories.  -We also reviewed symptoms from tumor pressure including worsening HA, loss of PVF, drooping eyelid, n/v, seizures. Patient denies any of these symptoms at this time  -Denies family hx of MEN1  Plan:  -Will obtain lab work to r/o hyper and hypofunction of pituitary adenoma.  Most of the lab work has been done in 5/2024.  Will just need to repeat a.m. cortisol, DST, IGF-I. BMP (pt does endorse some nocturia+)  -Will need to repeat yearly hormonal testing to r/u hypopit.  Next set of all pituitary labs to be done in 5/2025.  To also include E2, and urine, serum osmolalities.  -Clinical significance, indications for treatment  -has an appt for neuro optho in 3/2025.   -Will follow MRI in 1 yr for now and then may space out Q2-3 yrs months   -Patient verbalized understanding of above and denies any further questions    # History of primary hypothyroidism:  Thyroid:    Lab Results   Component Value Date    TSH 0.681 05/21/2024    TSH 0.882 06/03/2023    TSH 0.305 (L) 01/26/2023    T4F 1.1 05/21/2024    T4F 1.3 01/26/2023    T4F 1.0 10/26/2018       -Will obtain antibodies to detect etiology of hypothyroidism.  Patient has had this for several years.  On stable dose of levothyroxine 112 mcg.  -Discussed to Take Levothyroxine 60 mins before meals. Space 4 hrs from Ca, iron supplements. If taking biotin stop biotin 3-4 days before labs  - If you develop symptoms of high thyroid which are feeling jittery, tremory, palpitations,heart racing, sweating, being on edge or anxious, send me a my chart message  -Discussed that if patient has hypopituitarism we would adjust the dose of levothyroxine for goal free T4 of close to 1.    Orders Placed This Encounter   Procedures    TSH and Free T4 [E]    Cortisol [E]    Basic Metabolic Panel (8) [E]    Thyroid peroxidase & thyroglobulin ab    ACTH, Plasma    IGF-1     Orders placed today and for 6/2025    The above plan was discussed in detail with the  patient who verbalized understanding and agreement.      A total of 60 minutes was spent today on obtaining history, reviewing outside records, reviewing pertinent labs/imaging, reviewing relevant pathophysiology with patient, evaluating patient, providing multiple treatment options, communicating with patient's other providers as appropriate, and completing documentation and orders.      Raf Brown MD  [facility]  1/22/2025     Note to patient: The 21 Century Cures Act makes medical notes like these available to patients in the interest of transparency. However, be advised this is a medical document. It is intended as peer to peer communication. It is written in medical language and may contain abbreviations or verbiage that are unfamiliar. It may appear blunt or direct. Medical documents are intended to carry relevant information, facts as evident, and the clinical opinion of the practitioner.      In reviewing this note, please be advised that Dragon Voice Recognition software used to dictate the note may have made errors in recognizing some of the words or phrases.                       [1]   Allergies  Allergen Reactions    Hydrocodone NAUSEA AND VOMITING     Thinks codeine as well, hard time after surgery    Dust Mites UNKNOWN

## 2025-02-04 ENCOUNTER — OFFICE VISIT (OUTPATIENT)
Dept: INTERNAL MEDICINE CLINIC | Facility: CLINIC | Age: 60
End: 2025-02-04
Payer: COMMERCIAL

## 2025-02-04 ENCOUNTER — LAB ENCOUNTER (OUTPATIENT)
Dept: LAB | Age: 60
End: 2025-02-04
Attending: STUDENT IN AN ORGANIZED HEALTH CARE EDUCATION/TRAINING PROGRAM
Payer: COMMERCIAL

## 2025-02-04 VITALS
HEIGHT: 66 IN | SYSTOLIC BLOOD PRESSURE: 118 MMHG | DIASTOLIC BLOOD PRESSURE: 72 MMHG | HEART RATE: 87 BPM | WEIGHT: 158 LBS | RESPIRATION RATE: 22 BRPM | BODY MASS INDEX: 25.39 KG/M2

## 2025-02-04 DIAGNOSIS — D35.2 PITUITARY MICROADENOMA (HCC): ICD-10-CM

## 2025-02-04 DIAGNOSIS — E23.6 RATHKE'S CYST (HCC): ICD-10-CM

## 2025-02-04 DIAGNOSIS — E66.09 CLASS 2 OBESITY DUE TO EXCESS CALORIES WITHOUT SERIOUS COMORBIDITY WITH BODY MASS INDEX (BMI) OF 35.0 TO 35.9 IN ADULT: ICD-10-CM

## 2025-02-04 DIAGNOSIS — E06.3 HYPOTHYROIDISM DUE TO HASHIMOTO THYROIDITIS: ICD-10-CM

## 2025-02-04 DIAGNOSIS — E03.9 ACQUIRED HYPOTHYROIDISM: ICD-10-CM

## 2025-02-04 DIAGNOSIS — E66.812 CLASS 2 OBESITY DUE TO EXCESS CALORIES WITHOUT SERIOUS COMORBIDITY WITH BODY MASS INDEX (BMI) OF 35.0 TO 35.9 IN ADULT: ICD-10-CM

## 2025-02-04 DIAGNOSIS — Z51.81 THERAPEUTIC DRUG MONITORING: Primary | ICD-10-CM

## 2025-02-04 DIAGNOSIS — D35.2 PITUITARY ADENOMA (HCC): ICD-10-CM

## 2025-02-04 DIAGNOSIS — E78.5 HYPERLIPIDEMIA, UNSPECIFIED HYPERLIPIDEMIA TYPE: ICD-10-CM

## 2025-02-04 LAB
ANION GAP SERPL CALC-SCNC: 11 MMOL/L (ref 0–18)
BUN BLD-MCNC: 11 MG/DL (ref 9–23)
CALCIUM BLD-MCNC: 9.4 MG/DL (ref 8.7–10.6)
CHLORIDE SERPL-SCNC: 104 MMOL/L (ref 98–112)
CO2 SERPL-SCNC: 26 MMOL/L (ref 21–32)
CORTIS SERPL-MCNC: 21.9 UG/DL
CREAT BLD-MCNC: 0.96 MG/DL
EGFRCR SERPLBLD CKD-EPI 2021: 69 ML/MIN/1.73M2 (ref 60–?)
ESTRADIOL SERPL-MCNC: 27.8 PG/ML
FASTING STATUS PATIENT QL REPORTED: YES
GLUCOSE BLD-MCNC: 85 MG/DL (ref 70–99)
OSMOLALITY SERPL CALC.SUM OF ELEC: 291 MOSM/KG (ref 275–295)
POTASSIUM SERPL-SCNC: 3.9 MMOL/L (ref 3.5–5.1)
SODIUM SERPL-SCNC: 141 MMOL/L (ref 136–145)
T4 FREE SERPL-MCNC: 1.3 NG/DL (ref 0.8–1.7)
THYROGLOB SERPL-MCNC: 36 U/ML (ref ?–60)
THYROPEROXIDASE AB SERPL-ACNC: <28 U/ML (ref ?–60)
TSI SER-ACNC: 1.46 UIU/ML (ref 0.55–4.78)

## 2025-02-04 PROCEDURE — 84439 ASSAY OF FREE THYROXINE: CPT | Performed by: STUDENT IN AN ORGANIZED HEALTH CARE EDUCATION/TRAINING PROGRAM

## 2025-02-04 PROCEDURE — 84443 ASSAY THYROID STIM HORMONE: CPT | Performed by: STUDENT IN AN ORGANIZED HEALTH CARE EDUCATION/TRAINING PROGRAM

## 2025-02-04 PROCEDURE — 99213 OFFICE O/P EST LOW 20 MIN: CPT | Performed by: INTERNAL MEDICINE

## 2025-02-04 PROCEDURE — 86800 THYROGLOBULIN ANTIBODY: CPT | Performed by: STUDENT IN AN ORGANIZED HEALTH CARE EDUCATION/TRAINING PROGRAM

## 2025-02-04 PROCEDURE — 82670 ASSAY OF TOTAL ESTRADIOL: CPT | Performed by: STUDENT IN AN ORGANIZED HEALTH CARE EDUCATION/TRAINING PROGRAM

## 2025-02-04 PROCEDURE — 3008F BODY MASS INDEX DOCD: CPT | Performed by: INTERNAL MEDICINE

## 2025-02-04 PROCEDURE — 82533 TOTAL CORTISOL: CPT | Performed by: STUDENT IN AN ORGANIZED HEALTH CARE EDUCATION/TRAINING PROGRAM

## 2025-02-04 PROCEDURE — 84305 ASSAY OF SOMATOMEDIN: CPT | Performed by: STUDENT IN AN ORGANIZED HEALTH CARE EDUCATION/TRAINING PROGRAM

## 2025-02-04 PROCEDURE — 86376 MICROSOMAL ANTIBODY EACH: CPT | Performed by: STUDENT IN AN ORGANIZED HEALTH CARE EDUCATION/TRAINING PROGRAM

## 2025-02-04 PROCEDURE — 3074F SYST BP LT 130 MM HG: CPT | Performed by: INTERNAL MEDICINE

## 2025-02-04 PROCEDURE — 3078F DIAST BP <80 MM HG: CPT | Performed by: INTERNAL MEDICINE

## 2025-02-04 PROCEDURE — 80048 BASIC METABOLIC PNL TOTAL CA: CPT | Performed by: STUDENT IN AN ORGANIZED HEALTH CARE EDUCATION/TRAINING PROGRAM

## 2025-02-04 PROCEDURE — 82024 ASSAY OF ACTH: CPT | Performed by: STUDENT IN AN ORGANIZED HEALTH CARE EDUCATION/TRAINING PROGRAM

## 2025-02-04 NOTE — PROGRESS NOTES
HISTORY OF PRESENT ILLNESS  Chief Complaint   Patient presents with    Weight Check     Down 4        Jesusita Jorgensen is a 59 year old female here for follow up in medical weight loss program.     Down 4   Struggled with norovirus over [place]  Restarted on 1.7 mg weekly   Does feel that the appetite is up slightly   Weight has been stable  Following up with Dr. Brown with endocrine and following with hormone levels         [facility] Follow Up    General Information  Nutrition Recall  Exercise     Sleep                  Wt Readings from Last 6 Encounters:   02/04/25 158 lb (71.7 kg)   01/22/25 155 lb (70.3 kg)   10/25/24 159 lb (72.1 kg)   10/22/24 160 lb (72.6 kg)   05/21/24 159 lb (72.1 kg)   05/12/24 161 lb (73 kg)            Breakfast Lunch Dinner Snacks Fluids   REviewed             REVIEW OF SYSTEMS  GENERAL HEALTH: feels well otherwise, denied any fevers chills or night sweats   RESPIRATORY: denies shortness of breath   CARDIOVASCULAR: denies chest pain  GI: denies abdominal pain    EXAM  /72   Pulse 87   Resp 22   Ht 5' 6\" (1.676 m)   Wt 158 lb (71.7 kg)   LMP 01/11/2022 (Approximate)   BMI 25.50 kg/m²   GENERAL: well developed, well nourished,in no apparent distress, A/O x3  SKIN: no rashes,no suspicious lesions  HEENT: atraumatic, normocephalic, OP-clear, PERRL  NECK: supple,no adenopathy  LUNGS: clear to auscultation bilaterally   CARDIO: RRR without murmur  GI: good BS's,NT/ND, no masses or HSM  EXTREMITIES: no cyanosis, no clubbing, no edema          Lab Results   Component Value Date    WBC 4.3 05/21/2024    RBC 4.63 05/21/2024    HGB 13.9 05/21/2024    HCT 42.0 05/21/2024    MCV 90.7 05/21/2024    MCH 30.0 05/21/2024    MCHC 33.1 05/21/2024    RDW 13.1 05/21/2024    .0 05/21/2024     Lab Results   Component Value Date    GLU 84 10/05/2024    BUN 13 10/05/2024    BUNCREA 17.6 04/19/2019    CREATSERUM 0.94 10/05/2024    ANIONGAP 3 10/05/2024    GFR 75 02/25/2017    GFRNAA 80  04/25/2022    GFRAA 92 04/25/2022    CA 9.7 10/05/2024    OSMOCALC 287 10/05/2024    ALKPHO 50 05/21/2024    AST 12 (L) 05/21/2024    ALT 19 05/21/2024    BILT 0.6 05/21/2024    TP 6.5 05/21/2024    ALB 3.7 05/21/2024    GLOBULIN 2.8 05/21/2024     10/05/2024    K 4.3 10/05/2024     10/05/2024    CO2 30.0 10/05/2024     Lab Results   Component Value Date     04/04/2023    A1C 5.5 04/04/2023     Lab Results   Component Value Date    CHOLEST 186 05/21/2024    TRIG 100 05/21/2024    HDL 58 05/21/2024     (H) 05/21/2024    VLDL 17 05/21/2024    TCHDLRATIO 3.04 04/16/2018    NONHDLC 128 05/21/2024     Lab Results   Component Value Date    T4F 1.1 05/21/2024    TSH 0.681 05/21/2024     Lab Results   Component Value Date    B12 409 04/04/2023     Lab Results   Component Value Date    VITD 51.9 05/21/2024       Current Outpatient Medications on File Prior to Visit   Medication Sig Dispense Refill    buPROPion  MG Oral Tablet 24 Hr Take 1 tablet (150 mg total) by mouth daily. 90 tablet 0    acetaminophen 500 MG Oral Tab Take 1 tablet (500 mg total) by mouth as needed for Pain.      levothyroxine 112 MCG Oral Tab Take 1 tablet (112 mcg total) by mouth before breakfast. 90 tablet 3    ALPRAZolam 0.25 MG Oral Tab Take 1 tablet (0.25 mg total) by mouth nightly as needed. 30 tablet 0    Methylphenidate HCl ER, CD, 40 MG Oral Cap CR Take 1 capsule (40 mg total) by mouth daily.      NURTEC 75 MG Oral Tablet Dispersible PLACE 1 TABLET (75 MG) ON TOP OF TONGUE, ALLOW TO DISSOLVE THEN SWALLOW BY TRANSLINGUAL ROUTE EVERY OTHER DAY      fluticasone propionate 50 MCG/ACT Nasal Suspension 2 sprays by Each Nare route daily. 1 each 0    Cholecalciferol (VITAMIN D) 1000 UNITS Oral Tab Take 1,000 Units by mouth daily.      ibuprofen 200 MG Oral Tab Take 2 tablets (400 mg total) by mouth 2 (two) times daily.       No current facility-administered medications on file prior to visit.       ASSESSMENT  Analyzed  weight data:       Diagnoses and all orders for this visit:    Therapeutic drug monitoring    Hyperlipidemia, unspecified hyperlipidemia type    Class 2 obesity due to excess calories without serious comorbidity with body mass index (BMI) of 35.0 to 35.9 in adult    Acquired hypothyroidism    Other orders  -     semaglutide-weight management 1.7 MG/0.75ML Subcutaneous Solution Auto-injector; Inject 0.75 mL (1.7 mg total) into the skin once a week.              PLAN  Initital consult 4/23 at 206 lb   Down 4   Down 48 lb total   Doing  excellent with weight loss   Continue to encourage strength training  Continue wegovy at 1.7 mg q weekly   -advised of side effects and adverse effects of this medication  Increase strength training   We reviewed body recomposition at goal / close to goal   Reviewed pro/con of long term medication management  -advised of side effects and adverse effects of this medication  Nutrition: low carb diet/ recommended to eat breakfast daily/ regular protein intake  Medication use and side effects reviewed with patient.  Medication contraindications: n/a  Follow up with dietitian and psychologist as recommended.  Discussed the role of sleep and stress in weight management.  Counseled on comprehensive weight loss plan including attention to nutrition, exercise and behavior/stress management for success. See patient instruction below for more details.  Discussed strategies to overcome barriers to successful weight loss and weight maintenance  FITTE: ACSM recommendations (150-300 minutes/ week in active weight loss)   Weight Loss consent to treat reviewed and signed   There are no Patient Instructions on file for this visit.    No follow-ups on file.    Patient verbalizes understanding.    Betsy Pelaez MD

## 2025-02-05 LAB — ACTH: 29.2 PG/ML

## 2025-02-08 LAB
IGF I: 97 NG/ML
IGF-1, Z SCORE: -0.6 S.D.

## 2025-02-18 ENCOUNTER — OFFICE VISIT (OUTPATIENT)
Dept: SURGERY | Facility: CLINIC | Age: 60
End: 2025-02-18
Payer: COMMERCIAL

## 2025-02-18 VITALS — DIASTOLIC BLOOD PRESSURE: 66 MMHG | HEART RATE: 71 BPM | OXYGEN SATURATION: 98 % | SYSTOLIC BLOOD PRESSURE: 112 MMHG

## 2025-02-18 DIAGNOSIS — E23.7 PITUITARY LESION (HCC): Primary | ICD-10-CM

## 2025-02-18 PROCEDURE — 3078F DIAST BP <80 MM HG: CPT | Performed by: NEUROLOGICAL SURGERY

## 2025-02-18 PROCEDURE — 99212 OFFICE O/P EST SF 10 MIN: CPT | Performed by: NEUROLOGICAL SURGERY

## 2025-02-18 PROCEDURE — 3074F SYST BP LT 130 MM HG: CPT | Performed by: NEUROLOGICAL SURGERY

## 2025-02-18 RX ORDER — BUSPIRONE HYDROCHLORIDE 5 MG/1
1 TABLET ORAL DAILY
COMMUNITY

## 2025-02-18 RX ORDER — BUSPIRONE HYDROCHLORIDE 10 MG/1
1 TABLET ORAL 2 TIMES DAILY
COMMUNITY

## 2025-02-18 NOTE — PROGRESS NOTES
Neurosurgery Clinic Visit  2025    Jesusita Jorgensen PCP:  Jannette Godinez MD    3/14/1965 MRN [id number]     HISTORY OF PRESENT ILLNESS:  Jesusita Jorgensen is a(n) 59 year old female presents her follow-up regarding pituitary lesion  She is doing well  She saw endocrinology  She sees ophthalmology next month but follows with them regularly without neuro-ophthalmology  They have never noticed any issues        PHYSICAL EXAMINATION:  Vital Signs:  /66   Pulse 71   LMP 2022 (Approximate)   SpO2 98%   Awake alert, orient x 3  Follows commands x 4      REVIEW OF STUDIES:    MRI reviewed which shows relatively stable small likely Rathke's cleft cyst      ASSESSMENT and PLAN:  59-year-old female with small pituitary cyst  Will repeat her MRI in 1 year  After that if it still stable we will start removing it out  She follows up with neuro-ophthalmology next month  She is already seen endocrinology and her labs look good  Reviewed her films in detail  All questions were answered  Patient very appreciative        Time spent on counseling/coordination of care:  15 Minutes    Total time spent with patient:  15 Minutes      Miguel Grijalva MD   [facility]  2025  10:48 AM   Dictated but not proofread

## 2025-02-18 NOTE — PATIENT INSTRUCTIONS
Refill policies:    Allow 2-3 business days for refills; controlled substances may take longer.  Contact your pharmacy at least 5 days prior to running out of medication and have them send an electronic request or submit request through the “request refill” option in your [email or web address] account.  Refills are not addressed on weekends; covering physicians do not authorize routine medications on weekends.  No narcotics or controlled substances are refilled after noon on Fridays or by on call physicians.  By law, narcotics must be electronically prescribed.  A 30 day supply with no refills is the maximum allowed.  If your prescription is due for a refill, you may be due for a follow up appointment.  To best provide you care, patients receiving routine medications need to be seen at least once a year.  Patients receiving narcotic/controlled substance medications need to be seen at least once every 3 months.  In the event that your preferred pharmacy does not have the requested medication in stock (e.g. Backordered), it is your responsibility to find another pharmacy that has the requested medication available.  We will gladly send a new prescription to that pharmacy at your request.    Scheduling Tests:    If your physician has ordered radiology tests such as MRI or CT scans, please contact Central Scheduling at [phone] right away to schedule the test.  Once scheduled, the [facility] Centralized Referral Team will work with your insurance carrier to obtain pre-certification or prior authorization.  Depending on your insurance carrier, approval may take 3-10 days.  It is highly recommended patients assure they have received an authorization before having a test performed.  If test is done without insurance authorization, patient may be responsible for the entire amount billed.      Precertification and Prior Authorizations:  If your physician has recommended that you have a procedure or additional testing performed the [facility]  Centralized Referral Team will contact your insurance carrier to obtain pre-certification or prior authorization.    You are strongly encouraged to contact your insurance carrier to verify that your procedure/test has been approved and is a COVERED benefit.  Although the [facility] Centralized Referral Team does its due diligence, the insurance carrier gives the disclaimer that \"Although the procedure is authorized, this does not guarantee payment.\"    Ultimately the patient is responsible for payment.   Thank you for your understanding in this matter.  Paperwork Completion:  If you require FMLA or disability paperwork for your recovery, please make sure to either drop it off or have it faxed to our office at [phone]. Be sure the form has your name and date of birth on it.  The form will be faxed to our Forms Department and they will complete it for you.  There is a 25$ fee for all forms that need to be filled out.  Please be aware there is a 10-14 day turnaround time.  You will need to sign a release of information (WILLIAMS) form if your paperwork does not come with one.  You may call the Forms Department with any questions at [phone].  Their fax number is [phone].

## 2025-02-18 NOTE — PROGRESS NOTES
Patient with Pituitary lesion is here today for a follow up    IMAGING:  --12/17/2024 MRI pituitary    SPECIALISTS:  - Endocrinology-- Raf Brown MD 1/22/25  - Neuro ophthalmology- consult 3/25/2025

## 2025-04-08 DIAGNOSIS — E66.09 CLASS 2 OBESITY DUE TO EXCESS CALORIES WITHOUT SERIOUS COMORBIDITY WITH BODY MASS INDEX (BMI) OF 35.0 TO 35.9 IN ADULT: ICD-10-CM

## 2025-04-08 DIAGNOSIS — F41.9 ANXIETY: ICD-10-CM

## 2025-04-08 DIAGNOSIS — E66.812 CLASS 2 OBESITY DUE TO EXCESS CALORIES WITHOUT SERIOUS COMORBIDITY WITH BODY MASS INDEX (BMI) OF 35.0 TO 35.9 IN ADULT: ICD-10-CM

## 2025-04-10 RX ORDER — BUPROPION HYDROCHLORIDE 150 MG/1
150 TABLET ORAL DAILY
Qty: 30 TABLET | Refills: 0 | Status: SHIPPED | OUTPATIENT
Start: 2025-04-10

## 2025-04-10 NOTE — TELEPHONE ENCOUNTER
Requesting Bupropion 150mg  LOV: 5/21/24 Physical  RTC: not noted  Last Relevant Labs: 5/21/24  Filled: 12/19/24 #90 with 0 refills    Future Appointments   Date Time Provider Department Center   7/29/2025  9:00 AM Raf Brown MD [id number] [facility] [place]   8/26/2025 12:00 PM Betsy Pelaez MD [facility] [address]     Psychiatric Non-Scheduled (Anti-Anxiety) Yoatvy0304/08/2025 01:01 PM   Protocol Details   In person appointment or virtual visit in the past 6 mos or appointment in next 3 mos    Depression Screening completed within the past 12 months    Medication is active on med list     Rx sent to pharmacy per protocol

## 2025-04-22 DIAGNOSIS — E66.812 CLASS 2 OBESITY DUE TO EXCESS CALORIES WITHOUT SERIOUS COMORBIDITY WITH BODY MASS INDEX (BMI) OF 35.0 TO 35.9 IN ADULT: ICD-10-CM

## 2025-04-22 DIAGNOSIS — E66.09 CLASS 2 OBESITY DUE TO EXCESS CALORIES WITHOUT SERIOUS COMORBIDITY WITH BODY MASS INDEX (BMI) OF 35.0 TO 35.9 IN ADULT: ICD-10-CM

## 2025-04-22 DIAGNOSIS — F41.9 ANXIETY: ICD-10-CM

## 2025-04-22 RX ORDER — SEMAGLUTIDE 1.7 MG/.75ML
INJECTION, SOLUTION SUBCUTANEOUS
Qty: 9 ML | Refills: 0 | Status: SHIPPED | OUTPATIENT
Start: 2025-04-22

## 2025-04-22 NOTE — TELEPHONE ENCOUNTER
Requesting   Requested Prescriptions     Pending Prescriptions Disp Refills    WEGOVY 1.7 MG/0.75ML Subcutaneous Solution Auto-injector [Pharmacy Med Name: WEGOVY 1.7MG/0.75ML INJ ( 4 PENS)] 9 mL 0     Sig: ADMINISTER 1.7 MG UNDER THE SKIN 1 TIME A WEEK       LOV: 02/04/25  RTC: 08/26/25  Filled: 03/25/24 #3 with 5 refills    Future Appointments   Date Time Provider Department Center   7/29/2025  9:00 AM Raf Brown MD [id number] [facility] [place]   8/26/2025 12:00 PM Betsy Pelaez MD [facility]

## 2025-04-24 RX ORDER — BUPROPION HYDROCHLORIDE 150 MG/1
150 TABLET ORAL DAILY
Qty: 30 TABLET | Refills: 0 | OUTPATIENT
Start: 2025-04-24

## 2025-06-04 ENCOUNTER — MED REC SCAN ONLY (OUTPATIENT)
Dept: SURGERY | Facility: CLINIC | Age: 60
End: 2025-06-04

## 2025-06-04 ENCOUNTER — TELEPHONE (OUTPATIENT)
Dept: SURGERY | Facility: CLINIC | Age: 60
End: 2025-06-04

## 2025-06-04 NOTE — TELEPHONE ENCOUNTER
Received OV note 06/03/25 from Dr Clarke at [facility]. Paperwork endorsed to provider for review, placed in Christina's bin.

## 2025-06-11 DIAGNOSIS — F41.9 ANXIETY: ICD-10-CM

## 2025-06-11 DIAGNOSIS — E66.812 CLASS 2 OBESITY DUE TO EXCESS CALORIES WITHOUT SERIOUS COMORBIDITY WITH BODY MASS INDEX (BMI) OF 35.0 TO 35.9 IN ADULT: ICD-10-CM

## 2025-06-11 DIAGNOSIS — E66.09 CLASS 2 OBESITY DUE TO EXCESS CALORIES WITHOUT SERIOUS COMORBIDITY WITH BODY MASS INDEX (BMI) OF 35.0 TO 35.9 IN ADULT: ICD-10-CM

## 2025-06-11 RX ORDER — SEMAGLUTIDE 1.7 MG/.75ML
INJECTION, SOLUTION SUBCUTANEOUS
Qty: 9 ML | Refills: 0 | Status: CANCELLED | OUTPATIENT
Start: 2025-06-11

## 2025-06-13 RX ORDER — BUPROPION HYDROCHLORIDE 150 MG/1
150 TABLET ORAL DAILY
Qty: 15 TABLET | Refills: 0 | Status: SHIPPED | OUTPATIENT
Start: 2025-06-13

## 2025-06-13 NOTE — TELEPHONE ENCOUNTER
Requesting Bupropion 150mg  LOV: 5/21/24 Physical  RTC: prn  Last Relevant Labs: 5/21/24  Filled: 4/10/25 #30 with 0 refills    Future Appointments   Date Time Provider Department Center   6/23/2025  8:00 AM Jannette Godinez MD [facility] [address] [facility] [address]   7/29/2025  9:00 AM Raf Brown MD [id number] [facility] [place]   8/26/2025 12:00 PM Betsy Pelaez MD [facility] [address]     Psychiatric Non-Scheduled (Anti-Anxiety) Rfwrpb4006/11/2025 11:29 AM   Protocol Details   Depression Screening completed within the past 12 months    In person appointment or virtual visit in the past 6 mos or appointment in next 3 mos    Medication is active on med list     Patient has upcoming appointment but has cancelled the last 3 appointments after Rx was sent. Ok to refill prior to upcoming appointment?

## 2025-06-23 ENCOUNTER — OFFICE VISIT (OUTPATIENT)
Dept: FAMILY MEDICINE CLINIC | Facility: CLINIC | Age: 60
End: 2025-06-23
Payer: COMMERCIAL

## 2025-06-23 VITALS
WEIGHT: 164 LBS | SYSTOLIC BLOOD PRESSURE: 110 MMHG | OXYGEN SATURATION: 98 % | RESPIRATION RATE: 16 BRPM | TEMPERATURE: 98 F | HEART RATE: 62 BPM | DIASTOLIC BLOOD PRESSURE: 70 MMHG | BODY MASS INDEX: 26.05 KG/M2 | HEIGHT: 66.5 IN

## 2025-06-23 DIAGNOSIS — Z00.00 LABORATORY EXAM ORDERED AS PART OF ROUTINE GENERAL MEDICAL EXAMINATION: ICD-10-CM

## 2025-06-23 DIAGNOSIS — Z12.31 ENCOUNTER FOR SCREENING MAMMOGRAM FOR MALIGNANT NEOPLASM OF BREAST: ICD-10-CM

## 2025-06-23 DIAGNOSIS — Z90.11 H/O RIGHT MASTECTOMY: ICD-10-CM

## 2025-06-23 DIAGNOSIS — E55.9 VITAMIN D DEFICIENCY: ICD-10-CM

## 2025-06-23 DIAGNOSIS — Z00.00 ROUTINE MEDICAL EXAM: Primary | ICD-10-CM

## 2025-06-23 DIAGNOSIS — E03.9 ACQUIRED HYPOTHYROIDISM: ICD-10-CM

## 2025-06-23 DIAGNOSIS — D35.2 PITUITARY MICROADENOMA (HCC): ICD-10-CM

## 2025-06-23 RX ORDER — LEVOTHYROXINE SODIUM 112 UG/1
112 TABLET ORAL
Qty: 90 TABLET | Refills: 3 | Status: SHIPPED | OUTPATIENT
Start: 2025-06-23

## 2025-06-23 NOTE — PROGRESS NOTES
Chief Complaint   Patient presents with    Physical     Biometric Screening Form.       HPI:  Jesusita Jorgensen is a 60 year old female here today for preventative visit.    Imms- up to date with  tdap. Discussed shingrix & covid..        Cervical cancer screening- Had 1 abnormal in the past and just repeated and was normal. No intervention needed. No h/o HPV or genital warts.   Cotesting done on 4/19/2019 by myself.  Is now s/p hysterectomy and B oophorectomy since 2/8/22  No further paps needed.        H/o R breast CA s/p mastectomy   L Breast cancer screening  -Sister had breast cancer & now with recurrence 2025.  She herself had R breast cancer in 2015.   Sometimes gets mamm through Dr. August, breast surgeon. Last L mamm done 3/21/24. Gets done through [facility] so her surgeon can review it, so print order out yearly.        Colon cancer screening- No family h/o colon cancer. C-scope 11/13/24 repeat 3 yrs, many polyps, Dr. Leal, [facility].        Osteoporosis screening-  Dr. Grayson, rheum recommended dexa which was normal on 5/6/23.        Diet/exercise  Overweight  - working on this with the weight loss clinic. Taking Wegovy, bupropion, and methylphenidate through Dr. Pelaez. Was obese with max weight 215# on 4/2022. Brandon 155# on 1/22/25 with BMI 24.65 and now up 9# at 164#  Enjoys ruuning was compettive in [employer] college. Is not a chore, enjoys it, but her headaches, fatigue and jt pain make it harder to do it.        Dental/Eye Check up-  Recommended pt see dentist once every 6 months for a cleaning and once every year for an eye exam.        Hypothyroidism-  doing well on levothyroxine 112mcg. Normal on 2/4/25 labs        Anxiety/Depression- Takes alprazolam when anxious and can't help her sleep, but thinks if she takes it too often gets depressed. Takes 1-2 #/mo. Taking buproprion and buspirone 5mg but may change to 10mg shortly and has both on her list. Will speak with rxing provider soon.    Her kids have ADHD and she sometimes wonders about herself. Is on methylphenidate through wt loss clinic.        Vit D insufficiency- normal on 5/21/24. Taking vit d 1000IU/d.        Migraines  Pituitary adenoma  -used to see Jodi Straube NP from [facility] who found it, but recommended she see a neurosurgeon.  -Sees Dr. Gail allison through  Med.   -Sees Dr. Lovett, neurosurg, Was told stable and to f/u yearly  -Sees Dr. Raf Brown, endo, yearly who does the hormonal testing.     MRI Pituitary 12/17/24:  1. 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              Past Medical History[1]  Past Surgical History[2]  Medications Ordered Prior to Encounter[3]  Allergies[4]  Social History     Socioeconomic History    Marital status:      Spouse name: Not on file    Number of children: Not on file    Years of education: Not on file    Highest education level: Not on file   Occupational History    Not on file   Tobacco Use    Smoking status: Never    Smokeless tobacco: Never   Vaping Use    Vaping status: Never Used   Substance and Sexual Activity    Alcohol use: Yes     Comment: 1 drink 4-5x/yr, never a problem (Jeanna)    Drug use: Never    Sexual activity: Yes     Partners: Male     Birth control/protection: Hysterectomy   Other Topics Concern    Caffeine Concern No    Exercise Yes    Seat Belt Yes    Special Diet No    Stress Concern Yes    Weight Concern Yes     Service Not Asked    Blood Transfusions Not Asked    Occupational Exposure Not Asked    Hobby Hazards Not Asked    Sleep Concern Not Asked    Back Care Not Asked    Bike Helmet Not Asked    Self-Exams Not Asked   Social History Narrative    Not on file     Social Drivers of Health     Food Insecurity: No Food Insecurity (6/23/2025)    NCSS - Food Insecurity     Worried About Running Out of Food in the Last Year: No     Ran  Out of Food in the Last Year: No   Transportation Needs: No Transportation Needs (6/23/2025)    NCSS - Transportation     Lack of Transportation: No   Stress: Not on file   Housing Stability: Not At Risk (6/23/2025)    NCSS - Housing/Utilities     Has Housing: Yes     Worried About Losing Housing: No     Unable to Get Utilities: No     Family History[5]    Review of Systems - All systems reviewed and negative except for HPI    PHYSICAL EXAM:  /70   Pulse 62   Temp 97.9 °F (36.6 °C) (Temporal)   Resp 16   Ht 5' 6.5\" (1.689 m)   Wt 164 lb (74.4 kg)   LMP 01/11/2022 (Approximate)   SpO2 98%   BMI 26.07 kg/m²   GENERAL APPEARANCE:  Alert, no acute distress, appears stated age  HEENT:  Head- Normocephalic, atraumatic.    Eyes- Extraocular movements intact, pupils equally round and reactive to light,  conjunctivae normal.    Ears- Tympanic membranes intact bilaterally.    Nose- Patent, normal septum and turbinates.    Mouth/Throat- Normal oral mucosa, throat non-erythematous.  NECK:  No submental, submandibular, ant/post cervical lymphadenopathy. No thyromegaly or masses.  PULMONARY:  Lungs clear to auscultation bilaterally. No wheezes, rales, or rhonchi. Normal respiratory effort.  CARDIOVASCULAR:  Regular rate and rhythm. No murmurs, gallops, or rubs.  ABDOMEN:  + bowel sounds, soft, nontender, nondistended. No hepatomegaly.  MUSCULOSKELETAL: Strength of upper and lower extremities 5/5 bilaterally. Normal gait.  NEUROLOGIC:  Cranial nerves 2-12 grossly intact.  PSYCHIATRIC:  Normal mood, affect, and hygiene.   Breasts: L breast appear normal, no suspicious masses, no skin or nipple changes/discharge. S/p breast lift scars present. R breast mastectomy with implant present, no nipple. No supraclavicular or axillary nodes. Declined chaperone      ASSESSMENT/PLAN:    1. Routine medical exam    2. Encounter for screening mammogram for malignant neoplasm of breast  - [facility] JE 2D+3D SCREENING LEFT  (CPT=77067-52/60578); Future    3. H/O right mastectomy  - [facility] JE 2D+3D SCREENING LEFT (CPT=77067-52/40966); Future    4. Laboratory exam ordered as part of routine general medical examination  - CBC W Differential W Platelet [E]; Future  - Comp Metabolic Panel (14) [E]; Future  - Lipid Panel [E]; Future    5. Acquired hypothyroidism  - levothyroxine 112 MCG Oral Tab; Take 1 tablet (112 mcg total) by mouth before breakfast.  Dispense: 90 tablet; Refill: 3    6. Vitamin D deficiency  - Vitamin D [E]; Future    7. Pituitary microadenoma (HCC)  -stable, f/u with endo/neurosurg per their recs      Biometric form given to Mackenzie and will fax it once results done.    Patient verbalized understanding and agrees to plan.      No follow-ups on file.         [1]   Past Medical History:   Acquired hypothyroidism    ADD (attention deficit disorder) without hyperactivity    Dr. Bourne    Allergic rhinitis    mold, neg for pollen, but clinical rhinitis when goes outside    Anesthesia complication    Anxiety    Constipation    Depression    Diarrhea, unspecified    seems related to stress    Dysmenorrhea    Flatulence/gas pain/belching    not constant. more often this month    Frequent urination    Headache disorder    Heartburn    3 or 4 times per year    Hemorrhoids    after having 10 lb. baby    History of blood transfusion    History of depression    History of right breast cancer    R mastectomy, chemo (tamoxifen x 5 yrs ended 5/20) pelvic exam yearly while on it    IBS (irritable bowel syndrome)    Migraines    Pituitary microadenoma (HCC)    PONV (postoperative nausea and vomiting)    Reactive airway disease (HCC)    exercise, ?allergies?    Sjogren's syndrome (HCC)    sees Dr. Grayson    Stress    Visual impairment    glasses    Vitamin D insufficiency   [2]   Past Surgical History:  Procedure Laterality Date    Appendectomy      Colonoscopy N/A 07/23/2019    repeat 5 yrs, [facility]: Sandeep Leal MD;  Location:   ENDOSCOPY    Colonoscopy  11/13/2024    repeat 3 yrs, many polyps, Dr. Leal, [facility]    Hysterectomy  02/08/2022    + B oophorectomy Dr. Monico Dougherty at [facility], no further paps needed    Mastectomy right Right 2015    Other surgical history      dog bite, R chin   [3]   Current Outpatient Medications on File Prior to Visit   Medication Sig Dispense Refill    WEGOVY 1.7 MG/0.75ML Subcutaneous Solution Auto-injector ADMINISTER 1.7 MG UNDER THE SKIN 1 TIME A WEEK 9 mL 0    busPIRone 5 MG Oral Tab Take 1 tablet (5 mg total) by mouth daily.      acetaminophen 500 MG Oral Tab Take 1 tablet (500 mg total) by mouth as needed for Pain.      ALPRAZolam 0.25 MG Oral Tab Take 1 tablet (0.25 mg total) by mouth nightly as needed. 30 tablet 0    Methylphenidate HCl ER, CD, 40 MG Oral Cap CR Take 1 capsule (40 mg total) by mouth daily.      NURTEC 75 MG Oral Tablet Dispersible PLACE 1 TABLET (75 MG) ON TOP OF TONGUE, ALLOW TO DISSOLVE THEN SWALLOW BY TRANSLINGUAL ROUTE EVERY OTHER DAY      fluticasone propionate 50 MCG/ACT Nasal Suspension 2 sprays by Each Nare route daily. 1 each 0    Cholecalciferol (VITAMIN D) 1000 UNITS Oral Tab Take 1,000 Units by mouth daily.      ibuprofen 200 MG Oral Tab Take 2 tablets (400 mg total) by mouth in the morning and 2 tablets (400 mg total) before bedtime. As needed. (Patient taking differently: Take 2 tablets (400 mg total) by mouth as needed.)      buPROPion  MG Oral Tablet 24 Hr Take 1 tablet (150 mg total) by mouth daily. Last refill until appt kept 15 tablet 0    busPIRone 10 MG Oral Tab Take 1 tablet (10 mg total) by mouth 2 (two) times daily. (Patient not taking: Reported on 6/23/2025)       No current facility-administered medications on file prior to visit.   [4]   Allergies  Allergen Reactions    Hydrocodone NAUSEA AND VOMITING     Thinks codeine as well, hard time after surgery    Dust Mites UNKNOWN   [5]   Family History  Problem Relation Age of Onset     Lipids Mother     Cancer Mother         lung (smoker)    Pulmonary Disease Mother     Alcohol and Other Disorders Associated Father     Hypertension Father     Heart Attack Father 57    Breast Cancer Sister 53        plus recurrence (didn't take tamoxifen)    Skin cancer Brother         melanoma    No Known Problems Brother     No Known Problems Brother     No Known Problems Brother     No Known Problems Brother     No Known Problems Maternal Grandmother     No Known Problems Maternal Grandfather     No Known Problems Paternal Grandmother     No Known Problems Paternal Grandfather     Mental Disorder Maternal Aunt     Diabetes Maternal Aunt     Mental Disorder Maternal Uncle

## 2025-07-10 DIAGNOSIS — F41.9 ANXIETY: ICD-10-CM

## 2025-07-10 DIAGNOSIS — E66.09 CLASS 2 OBESITY DUE TO EXCESS CALORIES WITHOUT SERIOUS COMORBIDITY WITH BODY MASS INDEX (BMI) OF 35.0 TO 35.9 IN ADULT: ICD-10-CM

## 2025-07-10 DIAGNOSIS — E66.812 CLASS 2 OBESITY DUE TO EXCESS CALORIES WITHOUT SERIOUS COMORBIDITY WITH BODY MASS INDEX (BMI) OF 35.0 TO 35.9 IN ADULT: ICD-10-CM

## 2025-07-10 RX ORDER — BUPROPION HYDROCHLORIDE 150 MG/1
150 TABLET ORAL DAILY
Qty: 90 TABLET | Refills: 3 | Status: CANCELLED | OUTPATIENT
Start: 2025-07-10

## 2025-07-10 RX ORDER — BUPROPION HYDROCHLORIDE 150 MG/1
150 TABLET ORAL DAILY
Qty: 90 TABLET | Refills: 3 | Status: SHIPPED | OUTPATIENT
Start: 2025-07-10

## 2025-07-10 NOTE — TELEPHONE ENCOUNTER
BUPROPION XL 150MG TABLETS (24 H)          Will file in chart as: BUPROPION  MG Oral Tablet 24 Hr    Sig: TAKE 1 TABLET(150 MG) BY MOUTH DAILY    Disp: 15 tablet    Refills: 0 (Pharmacy requested: Not specified)    Start: 7/10/2025    Class: Normal    Non-formulary For: Anxiety, Class 2 obesity due to excess calories without serious comorbidity with body mass index (BMI) of 35.0 to 35.9 in adult    Last ordered: 3 weeks ago (2025) by Jannette Godinez MD    Last refill: 2025    Rx #: [id number]    Psychiatric Non-Scheduled (Anti-Anxiety) Qhipcb86/10/2025 01:57 PM   Protocol Details In person appointment or virtual visit in the past 6 mos or appointment in next 3 mos    Depression Screening completed within the past 12 months    Medication is active on med list      To be filled at: [id number] [address] [facility] [address], [phone], [phone]        LOV: 25 for physical  RTC: 1 year  Last Depression Screenin25  Filled: 25 #15 with 0 refills    Future Appointments   Date Time Provider Department Center   2025  9:00 AM Raf Brown MD [id number] [facility] [place]   2025 12:00 PM Betsy Pelaez MD [facility]     Refill request approved per protocol.

## 2025-07-11 NOTE — TELEPHONE ENCOUNTER
buPROPion  MG Oral Tablet 24 Hr          Possible duplicate: Jayver to review recent actions on this medication    Sig: Take 1 tablet (150 mg total) by mouth daily.    Disp: 90 tablet    Refills: 3    Start: 7/10/2025    Class: Normal    Non-formulary For: Anxiety, Class 2 obesity due to excess calories without serious comorbidity with body mass index (BMI) of 35.0 to 35.9 in adult    Last ordered: Yesterday (7/10/2025) by Jannette Godinez MD    Psychiatric Non-Scheduled (Anti-Anxiety) Ipdjbu61/10/2025 07:04 PM   Protocol Details In person appointment or virtual visit in the past 6 mos or appointment in next 3 mos    Depression Screening completed within the past 12 months    Medication is active on med list      To be filled at: [facility] #[id number] [place] [id number] AT [facility] OF [address] [facility], [phone], [phone]         buPROPion  MG Oral Tablet 24 Hr90 pzusgi30/10/2025--Sig - Route: TAKE 1 TABLET(150 MG) BY MOUTH DAILY - OralSent to pharmacy as: buPROPion HCl ER (XL) 150 MG Oral Tablet Extended Release 24 Hour (Wellbutrin XL)E-Prescribing Status: Receipt confirmed by pharmacy (7/10/2025  2:00 PM CDT)      Duplicate request.

## 2025-07-28 ENCOUNTER — LAB ENCOUNTER (OUTPATIENT)
Dept: LAB | Age: 60
End: 2025-07-28
Attending: FAMILY MEDICINE
Payer: COMMERCIAL

## 2025-07-28 DIAGNOSIS — Z00.00 LABORATORY EXAM ORDERED AS PART OF ROUTINE GENERAL MEDICAL EXAMINATION: ICD-10-CM

## 2025-07-28 DIAGNOSIS — D35.2 PITUITARY MICROADENOMA (HCC): ICD-10-CM

## 2025-07-28 DIAGNOSIS — E55.9 VITAMIN D DEFICIENCY: ICD-10-CM

## 2025-07-28 LAB
ALBUMIN SERPL-MCNC: 4.4 G/DL (ref 3.2–4.8)
ALBUMIN/GLOB SERPL: 1.9 (ref 1–2)
ALP LIVER SERPL-CCNC: 51 U/L (ref 46–118)
ALT SERPL-CCNC: 17 U/L (ref 10–49)
ANION GAP SERPL CALC-SCNC: 5 MMOL/L (ref 0–18)
AST SERPL-CCNC: 19 U/L (ref ?–34)
BASOPHILS # BLD AUTO: 0.01 X10(3) UL (ref 0–0.2)
BASOPHILS NFR BLD AUTO: 0.3 %
BILIRUB SERPL-MCNC: 0.5 MG/DL (ref 0.3–1.2)
BUN BLD-MCNC: 10 MG/DL (ref 9–23)
CALCIUM BLD-MCNC: 9.2 MG/DL (ref 8.7–10.6)
CHLORIDE SERPL-SCNC: 107 MMOL/L (ref 98–112)
CHOLEST SERPL-MCNC: 203 MG/DL (ref ?–200)
CO2 SERPL-SCNC: 28 MMOL/L (ref 21–32)
CORTIS SERPL-MCNC: 17.3 UG/DL
CREAT BLD-MCNC: 0.91 MG/DL (ref 0.55–1.02)
EGFRCR SERPLBLD CKD-EPI 2021: 73 ML/MIN/1.73M2 (ref 60–?)
EOSINOPHIL # BLD AUTO: 0.17 X10(3) UL (ref 0–0.7)
EOSINOPHIL NFR BLD AUTO: 4.4 %
ERYTHROCYTE [DISTWIDTH] IN BLOOD BY AUTOMATED COUNT: 13 %
FASTING PATIENT LIPID ANSWER: YES
FASTING STATUS PATIENT QL REPORTED: YES
FSH SERPL-ACNC: 68.1 MIU/ML
GLOBULIN PLAS-MCNC: 2.3 G/DL (ref 2–3.5)
GLUCOSE BLD-MCNC: 88 MG/DL (ref 70–99)
HCT VFR BLD AUTO: 40.5 % (ref 35–48)
HDLC SERPL-MCNC: 66 MG/DL (ref 40–59)
HGB BLD-MCNC: 13.6 G/DL (ref 12–16)
IMM GRANULOCYTES # BLD AUTO: 0 X10(3) UL (ref 0–1)
IMM GRANULOCYTES NFR BLD: 0 %
LDLC SERPL CALC-MCNC: 121 MG/DL (ref ?–100)
LH SERPL-ACNC: 34.3 MIU/ML
LYMPHOCYTES # BLD AUTO: 0.83 X10(3) UL (ref 1–4)
LYMPHOCYTES NFR BLD AUTO: 21.6 %
MCH RBC QN AUTO: 30.4 PG (ref 26–34)
MCHC RBC AUTO-ENTMCNC: 33.6 G/DL (ref 31–37)
MCV RBC AUTO: 90.4 FL (ref 80–100)
MONOCYTES # BLD AUTO: 0.49 X10(3) UL (ref 0.1–1)
MONOCYTES NFR BLD AUTO: 12.8 %
NEUTROPHILS # BLD AUTO: 2.34 X10 (3) UL (ref 1.5–7.7)
NEUTROPHILS # BLD AUTO: 2.34 X10(3) UL (ref 1.5–7.7)
NEUTROPHILS NFR BLD AUTO: 60.9 %
NONHDLC SERPL-MCNC: 137 MG/DL (ref ?–130)
OSMOLALITY SERPL CALC.SUM OF ELEC: 288 MOSM/KG (ref 275–295)
PLATELET # BLD AUTO: 242 10(3)UL (ref 150–450)
POTASSIUM SERPL-SCNC: 4 MMOL/L (ref 3.5–5.1)
PROLACTIN SERPL-MCNC: 5.7 NG/ML
PROT SERPL-MCNC: 6.7 G/DL (ref 5.7–8.2)
RBC # BLD AUTO: 4.48 X10(6)UL (ref 3.8–5.3)
SODIUM SERPL-SCNC: 140 MMOL/L (ref 136–145)
SP GR UR STRIP.AUTO: 1.01 (ref 1–1.03)
T4 FREE SERPL-MCNC: 1.5 NG/DL (ref 0.8–1.7)
TRIGL SERPL-MCNC: 88 MG/DL (ref 30–149)
TSI SER-ACNC: 0.29 UIU/ML (ref 0.55–4.78)
VIT D+METAB SERPL-MCNC: 36.4 NG/ML (ref 30–100)
VLDLC SERPL CALC-MCNC: 15 MG/DL (ref 0–30)
WBC # BLD AUTO: 3.8 X10(3) UL (ref 4–11)

## 2025-07-28 PROCEDURE — 81002 URINALYSIS NONAUTO W/O SCOPE: CPT | Performed by: STUDENT IN AN ORGANIZED HEALTH CARE EDUCATION/TRAINING PROGRAM

## 2025-07-28 PROCEDURE — 82533 TOTAL CORTISOL: CPT | Performed by: STUDENT IN AN ORGANIZED HEALTH CARE EDUCATION/TRAINING PROGRAM

## 2025-07-28 PROCEDURE — 83002 ASSAY OF GONADOTROPIN (LH): CPT | Performed by: STUDENT IN AN ORGANIZED HEALTH CARE EDUCATION/TRAINING PROGRAM

## 2025-07-28 PROCEDURE — 82306 VITAMIN D 25 HYDROXY: CPT | Performed by: STUDENT IN AN ORGANIZED HEALTH CARE EDUCATION/TRAINING PROGRAM

## 2025-07-28 PROCEDURE — 80061 LIPID PANEL: CPT | Performed by: STUDENT IN AN ORGANIZED HEALTH CARE EDUCATION/TRAINING PROGRAM

## 2025-07-28 PROCEDURE — 84305 ASSAY OF SOMATOMEDIN: CPT | Performed by: STUDENT IN AN ORGANIZED HEALTH CARE EDUCATION/TRAINING PROGRAM

## 2025-07-28 PROCEDURE — 84146 ASSAY OF PROLACTIN: CPT | Performed by: STUDENT IN AN ORGANIZED HEALTH CARE EDUCATION/TRAINING PROGRAM

## 2025-07-28 PROCEDURE — 82024 ASSAY OF ACTH: CPT | Performed by: STUDENT IN AN ORGANIZED HEALTH CARE EDUCATION/TRAINING PROGRAM

## 2025-07-28 PROCEDURE — 84439 ASSAY OF FREE THYROXINE: CPT | Performed by: STUDENT IN AN ORGANIZED HEALTH CARE EDUCATION/TRAINING PROGRAM

## 2025-07-28 PROCEDURE — 80050 GENERAL HEALTH PANEL: CPT | Performed by: STUDENT IN AN ORGANIZED HEALTH CARE EDUCATION/TRAINING PROGRAM

## 2025-07-28 PROCEDURE — 83001 ASSAY OF GONADOTROPIN (FSH): CPT | Performed by: STUDENT IN AN ORGANIZED HEALTH CARE EDUCATION/TRAINING PROGRAM

## 2025-07-29 LAB — ACTH: 31.4 PG/ML

## 2025-07-30 ENCOUNTER — PATIENT MESSAGE (OUTPATIENT)
Dept: FAMILY MEDICINE CLINIC | Facility: CLINIC | Age: 60
End: 2025-07-30

## 2025-07-31 LAB
IGF I: 104 NG/ML
IGF-1, Z SCORE: -0.4 S.D.

## 2025-08-26 ENCOUNTER — OFFICE VISIT (OUTPATIENT)
Dept: INTERNAL MEDICINE CLINIC | Facility: CLINIC | Age: 60
End: 2025-08-26

## 2025-08-26 VITALS
HEIGHT: 66 IN | SYSTOLIC BLOOD PRESSURE: 118 MMHG | DIASTOLIC BLOOD PRESSURE: 76 MMHG | RESPIRATION RATE: 20 BRPM | HEART RATE: 89 BPM | BODY MASS INDEX: 26.03 KG/M2 | WEIGHT: 162 LBS

## 2025-08-26 DIAGNOSIS — E66.812 CLASS 2 OBESITY DUE TO EXCESS CALORIES WITHOUT SERIOUS COMORBIDITY WITH BODY MASS INDEX (BMI) OF 35.0 TO 35.9 IN ADULT: ICD-10-CM

## 2025-08-26 DIAGNOSIS — Z51.81 THERAPEUTIC DRUG MONITORING: Primary | ICD-10-CM

## 2025-08-26 DIAGNOSIS — E66.09 CLASS 2 OBESITY DUE TO EXCESS CALORIES WITHOUT SERIOUS COMORBIDITY WITH BODY MASS INDEX (BMI) OF 35.0 TO 35.9 IN ADULT: ICD-10-CM

## 2025-08-26 DIAGNOSIS — E03.9 ACQUIRED HYPOTHYROIDISM: ICD-10-CM

## 2025-08-26 DIAGNOSIS — E78.5 HYPERLIPIDEMIA, UNSPECIFIED HYPERLIPIDEMIA TYPE: ICD-10-CM

## 2025-08-26 DIAGNOSIS — R25.2 MUSCLE CRAMPING: ICD-10-CM

## 2025-08-26 PROCEDURE — 3074F SYST BP LT 130 MM HG: CPT | Performed by: INTERNAL MEDICINE

## 2025-08-26 PROCEDURE — 99214 OFFICE O/P EST MOD 30 MIN: CPT | Performed by: INTERNAL MEDICINE

## 2025-08-26 PROCEDURE — 3008F BODY MASS INDEX DOCD: CPT | Performed by: INTERNAL MEDICINE

## 2025-08-26 PROCEDURE — 3078F DIAST BP <80 MM HG: CPT | Performed by: INTERNAL MEDICINE

## 2025-08-26 RX ORDER — BUSPIRONE HYDROCHLORIDE 15 MG/1
15 TABLET ORAL 2 TIMES DAILY
COMMUNITY
Start: 2025-04-10

## 2025-08-26 RX ORDER — PERFLUOROHEXYLOCTANE 1 MG/MG
SOLUTION OPHTHALMIC
COMMUNITY

## 2025-08-26 RX ORDER — LACTOBACIL 2/BIFIDO 1/S.THERMO 450B CELL
1 PACKET (EA) ORAL DAILY
Qty: 60 CAPSULE | Refills: 6 | COMMUNITY
Start: 2025-08-26

## 2025-08-26 RX ORDER — SEMAGLUTIDE 1.7 MG/.75ML
1.7 INJECTION, SOLUTION SUBCUTANEOUS WEEKLY
Qty: 9 ML | Refills: 1 | Status: SHIPPED | OUTPATIENT
Start: 2025-08-26

## 2025-08-26 RX ORDER — BUSPIRONE HYDROCHLORIDE 15 MG/1
15 TABLET ORAL 2 TIMES DAILY
COMMUNITY
Start: 2025-03-13

## (undated) DEVICE — Device: Brand: DEFENDO AIR/WATER/SUCTION AND BIOPSY VALVE

## (undated) DEVICE — 1200CC GUARDIAN II: Brand: GUARDIAN

## (undated) DEVICE — SNARE CAPTIFLEX MICRO-OVL OLY

## (undated) DEVICE — ENDOSCOPY PACK - LOWER: Brand: MEDLINE INDUSTRIES, INC.

## (undated) DEVICE — TRAP 4 CPTR CHMBR N EZ INLN

## (undated) DEVICE — FILTERLINE NASAL ADULT O2/CO2

## (undated) DEVICE — 3M™ RED DOT™ MONITORING ELECTRODE WITH FOAM TAPE AND STICKY GEL, 50/BAG, 20/CASE, 72/PLT 2570: Brand: RED DOT™

## (undated) NOTE — MR AVS SNAPSHOT
[facility] [address]  [phone]               Thank you for choosing us for your health care visit with Jeri Pérez MD.  We are glad to serve you and happy to provide you with MULTI-VITAMIN Tabs   Take 1 Tab by mouth daily. Rizatriptan Benzoate 10 MG Tabs   Take 1 tablet (10 mg total) by mouth as needed. Commonly known as:  MAXALT           Tamoxifen Citrate 20 MG Tabs   200 mg daily.    Commonly known as:  NO

## (undated) NOTE — LETTER
Patient Name: Blaine Ochoa  YOB: 1965          MRN number:  [id number]  Date:  7/11/2018  Referring Physician:  Ej Rizzo          UPPER EXTREMITY EVALUATION:    Referring Physician: Dr. Selvin Hall  Diagnosis: Adilia Snider due to scoliosis supported by the clinical findings of decreased ROM of the left shoulder with increased pain with resisted IR, increased pain with assumed posturing, and increased pain with thoracic right sidebending.     Cesar Bowman would benefit from [facility] 1. Increase FOTO assessment > 11% from INE to DC. 2. Patient will demonstrate greater ease with performing postural awarenss with functional assessment.     3. Patient will have an increase in cervical mobility to near functional limits in order to return

## (undated) NOTE — LETTER
Date: 1/16/2024    Patient Name: Jesusita Jorgensen          To Whom it may concern:    This letter has been written at the patient's request. The above patient was seen at the [facility] for treatment of a medical condition.  She cannot take stimulants due to her ADHD medication. She had tried and failed lifestyle changes including low calorie diet/ exercise attempts.       Patient started on Wegovy in May 2023, Weight is 206 lb and BMI was 33.0  Since that time she has had life changing results, down 42 lb.     It is well recognized that obesity is a chronic illness associated with many related   diseases, such as dyslipidemia and hypertension. Obesity deserves the same treatment and attention as any other chronic illness.  Discontinuing her medication will be detrimental to her health    Sincerely,    Betsy Pelaez MD

## (undated) NOTE — LETTER
Patient Name: Meche Ray  YOB: 1965          MRN number:  [id number]  Date:  1/28/2020  Referring Physician:  No ref. provider found         INITIAL EVALUATION:    Referring Physician: Dr. Jones ref.  provider found  Diagnosis: Adhesive C significant pain or limitations. Pt goals include decreasing complaints of pain and returning to her previous level of function. Past medical history was reviewed with Kayleigh Roque.  Significant findings include  has a past medical history of Acquired hypothyr joint AP/inferior glides.  The patient tolerated exercises well with slightly improved shoulder ROM noted    Charges: PT Eval Moderate complexity x1, TherEx x 1      Total Timed Treatment: 10 min     Total Treatment Time: 40 min     PLAN OF CARE:    Goals:

## (undated) NOTE — MR AVS SNAPSHOT
[facility] [address]  [facility]  [phone]               Thank you for choosing us for your health care visit with Kalyn Tyler MD.  We are glad to serve you and happy to provide you with [facility] Take 1 tablet (200 mg total) by mouth 2 (two) times daily. Commonly known as:  PLAQUENIL           Levothyroxine Sodium 125 MCG Tabs   Take 1 tablet (125 mcg total) by mouth once daily.    Commonly known as:  SYNTHROID, LEVOTHROID           MULTI-VITAMIN Summaries. If you've been to the Emergency Department or your doctor's office, you can view your past visit information in [employer] by going to Visits < Visit Summaries. [employer] questions? Call [phone] for help.   [employer] is NOT to be used for urge

## (undated) NOTE — LETTER
[address]  [phone]          Patient: Gume Ye   YOB: 1965   Date of Visit: 4/1/2020       Dear Employer,         April 1, 2020    At it is at all feasible for them to do so. COVID-19 is especially risky for high-risk patients (including, but not limited to, age over 61, immunosuppressed status due to disease or medication, chronic respiratory or heart conditions and diabetes).     · Eli De Guzman

## (undated) NOTE — LETTER
April 1, 2020    Patient: Dyan Castillo   YOB: 1965     Dear Employer,  At [facility], we are taking special precautions and doing everything we can to prevent the spread of COVID-19 (coronavirus disease 2019).  During this t immunosuppressed status due to disease or medication, chronic respiratory or heart conditions and diabetes). ?  During the social distancing period imposed by the [place] in March, any employee who can be isolated at home and avoid exposure to th